# Patient Record
Sex: FEMALE | Race: WHITE | NOT HISPANIC OR LATINO | Employment: OTHER | URBAN - METROPOLITAN AREA
[De-identification: names, ages, dates, MRNs, and addresses within clinical notes are randomized per-mention and may not be internally consistent; named-entity substitution may affect disease eponyms.]

---

## 2017-01-19 ENCOUNTER — HOSPITAL ENCOUNTER (OUTPATIENT)
Dept: CARDIOLOGY | Facility: MEDICAL CENTER | Age: 72
End: 2017-01-19
Attending: INTERNAL MEDICINE
Payer: MEDICARE

## 2017-01-19 DIAGNOSIS — I47.29 NSVT (NONSUSTAINED VENTRICULAR TACHYCARDIA) (HCC): ICD-10-CM

## 2017-01-19 PROCEDURE — 93306 TTE W/DOPPLER COMPLETE: CPT

## 2017-01-20 ENCOUNTER — TELEPHONE (OUTPATIENT)
Dept: CARDIOLOGY | Facility: MEDICAL CENTER | Age: 72
End: 2017-01-20

## 2017-01-20 DIAGNOSIS — R09.89 CAROTID BRUIT, UNSPECIFIED LATERALITY: ICD-10-CM

## 2017-01-20 LAB
LV EJECT FRACT MOD 2C 99903: 52.66
LV EJECT FRACT MOD 4C 99902: 57.21
LV EJECT FRACT MOD BP 99901: 53.8

## 2017-01-21 NOTE — TELEPHONE ENCOUNTER
DONE. Pt Called with message. No answer. Left VM    ----- Message from Leta Davis M.D. sent at 1/20/2017 12:48 PM PST -----    Left ventricular ejection fraction is visually estimated to be 55%.   Mild mitral regurgitation.  Estimated right ventricular systolic pressure  is 29 mmHg. Right atrial pressure is estimated to be 3 mmHg.     ----- Message -----     From: Heath Josef     Sent: 1/20/2017  12:35 PM       To: Leta Davis M.D.

## 2017-01-25 ENCOUNTER — HOSPITAL ENCOUNTER (OUTPATIENT)
Dept: LAB | Facility: MEDICAL CENTER | Age: 72
End: 2017-01-25
Attending: INTERNAL MEDICINE
Payer: MEDICARE

## 2017-01-25 ENCOUNTER — HOSPITAL ENCOUNTER (OUTPATIENT)
Dept: RADIOLOGY | Facility: MEDICAL CENTER | Age: 72
End: 2017-01-25
Attending: INTERNAL MEDICINE
Payer: MEDICARE

## 2017-01-25 DIAGNOSIS — I70.0 ATHEROSCLEROSIS OF AORTA (HCC): ICD-10-CM

## 2017-01-25 DIAGNOSIS — I47.29 NSVT (NONSUSTAINED VENTRICULAR TACHYCARDIA) (HCC): ICD-10-CM

## 2017-01-25 LAB
ALBUMIN SERPL BCP-MCNC: 4.3 G/DL (ref 3.2–4.9)
ALBUMIN/GLOB SERPL: 1.2 G/DL
ALP SERPL-CCNC: 64 U/L (ref 30–99)
ALT SERPL-CCNC: 23 U/L (ref 2–50)
ANION GAP SERPL CALC-SCNC: 10 MMOL/L (ref 0–11.9)
AST SERPL-CCNC: 23 U/L (ref 12–45)
BILIRUB SERPL-MCNC: 1 MG/DL (ref 0.1–1.5)
BUN SERPL-MCNC: 25 MG/DL (ref 8–22)
CALCIUM SERPL-MCNC: 9.5 MG/DL (ref 8.4–10.2)
CHLORIDE SERPL-SCNC: 97 MMOL/L (ref 96–112)
CHOLEST SERPL-MCNC: 191 MG/DL (ref 100–199)
CO2 SERPL-SCNC: 28 MMOL/L (ref 20–33)
CREAT SERPL-MCNC: 0.83 MG/DL (ref 0.5–1.4)
GFR SERPL CREATININE-BSD FRML MDRD: >60 ML/MIN/1.73 M 2
GLOBULIN SER CALC-MCNC: 3.7 G/DL (ref 1.9–3.5)
GLUCOSE SERPL-MCNC: 100 MG/DL (ref 65–99)
HDLC SERPL-MCNC: 48 MG/DL
LDLC SERPL CALC-MCNC: 122 MG/DL
POTASSIUM SERPL-SCNC: 3.9 MMOL/L (ref 3.6–5.5)
PROT SERPL-MCNC: 8 G/DL (ref 6–8.2)
SODIUM SERPL-SCNC: 135 MMOL/L (ref 135–145)
TRIGL SERPL-MCNC: 103 MG/DL (ref 0–149)

## 2017-01-25 PROCEDURE — 36415 COLL VENOUS BLD VENIPUNCTURE: CPT

## 2017-01-25 PROCEDURE — 80061 LIPID PANEL: CPT

## 2017-01-25 PROCEDURE — 700111 HCHG RX REV CODE 636 W/ 250 OVERRIDE (IP)

## 2017-01-25 PROCEDURE — A9502 TC99M TETROFOSMIN: HCPCS

## 2017-01-25 PROCEDURE — 80053 COMPREHEN METABOLIC PANEL: CPT

## 2017-01-25 RX ORDER — REGADENOSON 0.08 MG/ML
INJECTION, SOLUTION INTRAVENOUS
Status: DISPENSED
Start: 2017-01-25 | End: 2017-01-25

## 2017-01-30 ENCOUNTER — HOSPITAL ENCOUNTER (OUTPATIENT)
Dept: RADIOLOGY | Facility: MEDICAL CENTER | Age: 72
End: 2017-01-30
Attending: INTERNAL MEDICINE
Payer: MEDICARE

## 2017-01-30 DIAGNOSIS — R09.89 CAROTID BRUIT, UNSPECIFIED LATERALITY: ICD-10-CM

## 2017-01-30 PROCEDURE — 93880 EXTRACRANIAL BILAT STUDY: CPT | Mod: 26 | Performed by: INTERNAL MEDICINE

## 2017-01-30 PROCEDURE — 93880 EXTRACRANIAL BILAT STUDY: CPT

## 2017-02-08 ENCOUNTER — TELEPHONE (OUTPATIENT)
Dept: CARDIOLOGY | Facility: MEDICAL CENTER | Age: 72
End: 2017-02-08

## 2017-02-08 DIAGNOSIS — E78.5 HYPERLIPIDEMIA, UNSPECIFIED HYPERLIPIDEMIA TYPE: ICD-10-CM

## 2017-02-08 RX ORDER — ROSUVASTATIN CALCIUM 20 MG/1
20 TABLET, COATED ORAL EVERY EVENING
Qty: 90 TAB | Refills: 3 | OUTPATIENT
Start: 2017-02-08 | End: 2017-04-10 | Stop reason: SDUPTHER

## 2017-02-09 NOTE — TELEPHONE ENCOUNTER
----- Message from Leta Davis M.D. sent at 1/30/2017  7:34 AM PST -----  LDL is not at goal  D/c lipitor  Start crestor 20 mg qHs.  Recheck labs in 2 months  Leta  ----- Message -----     From: Sigrid Garcia R.N.     Sent: 1/27/2017   9:27 AM       To: Leta Davis M.D.    F/u 2/7

## 2017-02-09 NOTE — TELEPHONE ENCOUNTER
Pt notified. I called in new Rx to [facility] pharmacy [facility]. She will  lab order when she sees Dr. Davis 2/23. I gave her the results of echo, MPI, & carotid but told her Dr. MARVIN would review in detail at visit.

## 2017-02-15 ENCOUNTER — TELEPHONE (OUTPATIENT)
Dept: CARDIOLOGY | Facility: MEDICAL CENTER | Age: 72
End: 2017-02-15

## 2017-02-15 NOTE — TELEPHONE ENCOUNTER
Mailbox is full and not accepting messages now.  Would like her to call her prescription provider and see what is on her formulary.

## 2017-02-15 NOTE — TELEPHONE ENCOUNTER
----- Message -----     From: Leeann Jasmine     Sent: 2/14/2017  11:33 AM       To: Lata Ko R.N.       Patient can't afford her prescription for Crestor and wants to find out what other medication she can take that's cheaper. She can be reached at [phone] for a call back.

## 2017-02-23 ENCOUNTER — OFFICE VISIT (OUTPATIENT)
Dept: CARDIOLOGY | Facility: MEDICAL CENTER | Age: 72
End: 2017-02-23
Payer: MEDICARE

## 2017-02-23 VITALS
SYSTOLIC BLOOD PRESSURE: 134 MMHG | HEIGHT: 65 IN | OXYGEN SATURATION: 90 % | HEART RATE: 54 BPM | BODY MASS INDEX: 23.49 KG/M2 | DIASTOLIC BLOOD PRESSURE: 76 MMHG | WEIGHT: 141 LBS

## 2017-02-23 DIAGNOSIS — I70.0 ATHEROSCLEROSIS OF AORTA (HCC): ICD-10-CM

## 2017-02-23 DIAGNOSIS — I10 ESSENTIAL HYPERTENSION, BENIGN: ICD-10-CM

## 2017-02-23 DIAGNOSIS — Z13.6 SCREENING FOR ABDOMINAL AORTIC ANEURYSM: ICD-10-CM

## 2017-02-23 DIAGNOSIS — E78.5 DYSLIPIDEMIA: ICD-10-CM

## 2017-02-23 DIAGNOSIS — Z71.6 ENCOUNTER FOR SMOKING CESSATION COUNSELING: ICD-10-CM

## 2017-02-23 PROCEDURE — 3014F SCREEN MAMMO DOC REV: CPT | Mod: 8P | Performed by: INTERNAL MEDICINE

## 2017-02-23 PROCEDURE — 4004F PT TOBACCO SCREEN RCVD TLK: CPT | Performed by: INTERNAL MEDICINE

## 2017-02-23 PROCEDURE — 4040F PNEUMOC VAC/ADMIN/RCVD: CPT | Mod: 8P | Performed by: INTERNAL MEDICINE

## 2017-02-23 PROCEDURE — 99214 OFFICE O/P EST MOD 30 MIN: CPT | Performed by: INTERNAL MEDICINE

## 2017-02-23 PROCEDURE — 3017F COLORECTAL CA SCREEN DOC REV: CPT | Mod: 8P | Performed by: INTERNAL MEDICINE

## 2017-02-23 PROCEDURE — G8420 CALC BMI NORM PARAMETERS: HCPCS | Performed by: INTERNAL MEDICINE

## 2017-02-23 PROCEDURE — 1101F PT FALLS ASSESS-DOCD LE1/YR: CPT | Mod: 8P | Performed by: INTERNAL MEDICINE

## 2017-02-23 PROCEDURE — G8432 DEP SCR NOT DOC, RNG: HCPCS | Performed by: INTERNAL MEDICINE

## 2017-02-23 PROCEDURE — G8484 FLU IMMUNIZE NO ADMIN: HCPCS | Performed by: INTERNAL MEDICINE

## 2017-02-23 RX ORDER — EZETIMIBE 10 MG/1
10 TABLET ORAL DAILY
Qty: 30 TAB | Refills: 11 | Status: SHIPPED | OUTPATIENT
Start: 2017-02-23 | End: 2017-08-24

## 2017-02-23 ASSESSMENT — ENCOUNTER SYMPTOMS
SEIZURES: 0
WEAKNESS: 0
ORTHOPNEA: 0
BLURRED VISION: 0
NAUSEA: 0
CLAUDICATION: 0
HEADACHES: 0
PALPITATIONS: 1
DEPRESSION: 1
LOSS OF CONSCIOUSNESS: 0
VOMITING: 0
BLOOD IN STOOL: 0
ABDOMINAL PAIN: 0
PND: 0
HEMOPTYSIS: 0
NERVOUS/ANXIOUS: 1
DIZZINESS: 0
COUGH: 0
SHORTNESS OF BREATH: 1
BRUISES/BLEEDS EASILY: 0

## 2017-02-23 NOTE — Clinical Note
[facility]   [address]  [place] [address]  Phone: [phone]  Fax: [phone]              Jose Chappell  1945    Encounter Date: 2/23/2017    Leta Davis M.D.          PROGRESS NOTE:  Subjective:   Jose Chappell is a 71 y.o. female with known history of smoking 40 pack years, atherosclerosis of aorta seen on abdominal x-ray, runs of PVCs, dyslipidemia, hypertension presenting today to review the test results with me.    Patient still continues to have intermittent episodes of fluttering sensation in the chest. No complaints of exertional chest pain pressure or tightness. Denied any complaints of orthopnea or PND. No complaints of lower extremity edema or claudication. Denied any complaints of dizziness, lightheadedness or LOC.    Past Medical History   Diagnosis Date   • Hypertension    • Hyperlipidemia    • Rheumatoid arthritis, adult (CMS-HCC)      Past Surgical History   Procedure Laterality Date   • Hysterectomy, vaginal     • Tonsillectomy       Family History   Problem Relation Age of Onset   • Other Mother      Breast cancer   • Lung Disease Father      COPD   • Heart Failure Sister    • Heart Attack Sister      MI at a59     History   Smoking status   • Current Every Day Smoker -- 1.00 packs/day for 40 years   • Types: Cigarettes   Smokeless tobacco   • Never Used     No Known Allergies  Outpatient Encounter Prescriptions as of 2/23/2017   Medication Sig Dispense Refill   • ezetimibe (ZETIA) 10 MG Tab Take 1 Tab by mouth every day. 30 Tab 11   • metoprolol (LOPRESSOR) 50 MG Tab Take 1 Tab by mouth 2 times a day. 60 Tab 11   • montelukast (SINGULAIR) 10 MG Tab Take 10 mg by mouth every day.     • albuterol 108 (90 BASE) MCG/ACT Aero Soln inhalation aerosol Inhale 2 Puffs by mouth every 6 hours as needed for Shortness of Breath.     • alendronate (FOSAMAX) 70 MG Tab Take 70 mg by mouth every 7 days.     •  "escitalopram (LEXAPRO) 10 MG Tab Take 10 mg by mouth every day.     • infliximab (REMICADE) 100 MG Recon Soln by Intravenous route.     • tiotropium (SPIRIVA) 18 MCG Cap Inhale 18 mcg by mouth every day.     • lisinopril (PRINIVIL, ZESTRIL) 40 MG tablet Take 40 mg by mouth every day.     • rosuvastatin (CRESTOR) 20 MG Tab Take 1 Tab by mouth every evening. 90 Tab 3     No facility-administered encounter medications on file as of 2/23/2017.     Review of Systems   Constitutional: Negative for malaise/fatigue.   HENT: Negative for tinnitus.    Eyes: Negative for blurred vision.   Respiratory: Positive for shortness of breath. Negative for cough and hemoptysis.    Cardiovascular: Positive for palpitations. Negative for chest pain, orthopnea, claudication, leg swelling and PND.   Gastrointestinal: Negative for nausea, vomiting, abdominal pain, blood in stool and melena.   Genitourinary: Negative for dysuria and hematuria.   Musculoskeletal: Positive for joint pain.   Skin: Negative for rash.   Neurological: Negative for dizziness, seizures, loss of consciousness, weakness and headaches.   Endo/Heme/Allergies: Does not bruise/bleed easily.   Psychiatric/Behavioral: Positive for depression. The patient is nervous/anxious.         Objective:   /76 mmHg  Pulse 54  Ht 1.651 m (5' 5\")  Wt 63.957 kg (141 lb)  BMI 23.46 kg/m2  SpO2 90%    Physical Exam   Constitutional: She is oriented to person, place, and time. She appears well-developed and well-nourished. No distress.   HENT:   Head: Atraumatic.   Eyes: Conjunctivae and EOM are normal. Pupils are equal, round, and reactive to light.   Neck: Neck supple. No JVD present.   Cardiovascular: Normal rate and regular rhythm.  Frequent extrasystoles are present.   No murmur heard.  Pulses:       Femoral pulses are 2+ on the right side, and 2+ on the left side.       Posterior tibial pulses are 1+ on the right side, and 1+ on the left side.   Pulmonary/Chest: Effort " normal. No respiratory distress. She has no wheezes. She has no rales.   Decreased breath sounds bilaterally   Abdominal: Soft. There is no tenderness.   Musculoskeletal: She exhibits no edema.   Neurological: She is alert and oriented to person, place, and time.   Skin: Skin is warm and dry. She is not diaphoretic.   Psychiatric: She has a normal mood and affect.      Results for MICHELE DELA CRUZ (MRN [id number]) as of 1/30/2017 07:33   1/25/2017    Sodium 135   Potassium 3.9   Chloride 97   Co2 28   Anion Gap 10.0   Glucose 100 (H)   Bun 25 (H)   Creatinine 0.83   GFR If Non African American >60   Calcium 9.5   AST(SGOT) 23   ALT(SGPT) 23   Alkaline Phosphatase 64   Cholesterol,Tot 191   Triglycerides 103   HDL 48    (H)     Carotid doppler: 1/30/17  No previous duplex.  Very mild plaque of the bilateral carotid bifurcation. Doppler velocities are normal.   Bilateral subclavian and vertebral artery waveforms are antegrade and waveforms are normal in character and velocity    TTE:1/19/17  No prior study is available for comparison.   Left ventricular ejection fraction is visually estimated to be 55%.   Normal regional wall motion. Grade I diastolic dysfunction.  Mild mitral regurgitation.  Estimated right ventricular systolic pressure  is 29 mmHg. Right atrial pressure is estimated to be 3 mmHg.     Nuclear stress test: 1/25/17  Exercise capacity fair to good at 6-10 METS.   No ischemic changes with exercise.   No evidence of significant jeopardized viable myocardium or prior myocardial infarction.  Normal left ventricular size, ejection fraction, and wall motion.  ECG INTERPRETATION  Exercise capacity fair to good at 6-10 METS    Holter monitor: 10/5/16  Underlying rhythm sinus with a minimum heart rate of 48 bpm, average heart rate of 79 bpm with a maximum heart rate of 130 bpm  Total ventricular ectopic beats 12.6%.  3 beat run of PVCs  Longest run of PSVT is 16 beats.  No symptoms documented.  16 beat  run of PSVT     Labs: 16  Glucose 100, BUN 16, creatinine 0.75, bicarbonate 28.7, sodium 142, potassium 4.8, chloride 107  Alkaline phosphatase 67, ALT 38 AST 18  Triglycerides 59, total cholesterol 155, HDL 56, LDL 87    EK16  EKG personally reviewed by me.  Sinus rhythm normal axis LVH with repolarization abnormalities, PVC  Assessment:     1. Screening for abdominal aortic aneurysm  US-AORTA   2. Essential hypertension, benign     3. Atherosclerosis of aorta (CMS-HCC)  US-AORTA   4. Dyslipidemia  ezetimibe (ZETIA) 10 MG Tab    COMP METABOLIC PANEL    LIPID PROFILE    COMP METABOLIC PANEL    LIPID PROFILE   5. Encounter for smoking cessation counseling  REFERRAL TO TOBACCO CESSATION PROGRAM       Medical Decision Making:  Today's Assessment / Status / Plan:     1. Abdominal aortic Doppler prior to next visit.   2. Blood pressure well controlled at the present visit.  Continue lisinopril 40 mg daily.  Continue Lopressor 50 mg BID.  3. Patient currently on Lipitor.  4. Continue Lipitor 40 mg qHs.  Start Zetia 10 mg daily  Check labs in 2 months  5. Referral for smoking cessation program.    Follow-up in one year.  Check labs in 2 months and then in one year..    Thank you for allowing me to participate in taking care of patient.    Leta Davis. MD.        Viv Ramsey, A.P.R.N.  [address] [address]  VIA In Basket

## 2017-02-23 NOTE — PROGRESS NOTES
Subjective:   Jose Chappell is a 71 y.o. female with known history of smoking 40 pack years, atherosclerosis of aorta seen on abdominal x-ray, runs of PVCs, dyslipidemia, hypertension presenting today to review the test results with me.    Patient still continues to have intermittent episodes of fluttering sensation in the chest. No complaints of exertional chest pain pressure or tightness. Denied any complaints of orthopnea or PND. No complaints of lower extremity edema or claudication. Denied any complaints of dizziness, lightheadedness or LOC.    Past Medical History   Diagnosis Date   • Hypertension    • Hyperlipidemia    • Rheumatoid arthritis, adult (CMS-HCC)      Past Surgical History   Procedure Laterality Date   • Hysterectomy, vaginal     • Tonsillectomy       Family History   Problem Relation Age of Onset   • Other Mother      Breast cancer   • Lung Disease Father      COPD   • Heart Failure Sister    • Heart Attack Sister      MI at a59     History   Smoking status   • Current Every Day Smoker -- 1.00 packs/day for 40 years   • Types: Cigarettes   Smokeless tobacco   • Never Used     No Known Allergies  Outpatient Encounter Prescriptions as of 2/23/2017   Medication Sig Dispense Refill   • ezetimibe (ZETIA) 10 MG Tab Take 1 Tab by mouth every day. 30 Tab 11   • metoprolol (LOPRESSOR) 50 MG Tab Take 1 Tab by mouth 2 times a day. 60 Tab 11   • montelukast (SINGULAIR) 10 MG Tab Take 10 mg by mouth every day.     • albuterol 108 (90 BASE) MCG/ACT Aero Soln inhalation aerosol Inhale 2 Puffs by mouth every 6 hours as needed for Shortness of Breath.     • alendronate (FOSAMAX) 70 MG Tab Take 70 mg by mouth every 7 days.     • escitalopram (LEXAPRO) 10 MG Tab Take 10 mg by mouth every day.     • infliximab (REMICADE) 100 MG Recon Soln by Intravenous route.     • tiotropium (SPIRIVA) 18 MCG Cap Inhale 18 mcg by mouth every day.     • lisinopril (PRINIVIL, ZESTRIL) 40 MG tablet Take 40 mg by mouth every day.    "  • rosuvastatin (CRESTOR) 20 MG Tab Take 1 Tab by mouth every evening. 90 Tab 3     No facility-administered encounter medications on file as of 2/23/2017.     Review of Systems   Constitutional: Negative for malaise/fatigue.   HENT: Negative for tinnitus.    Eyes: Negative for blurred vision.   Respiratory: Positive for shortness of breath. Negative for cough and hemoptysis.    Cardiovascular: Positive for palpitations. Negative for chest pain, orthopnea, claudication, leg swelling and PND.   Gastrointestinal: Negative for nausea, vomiting, abdominal pain, blood in stool and melena.   Genitourinary: Negative for dysuria and hematuria.   Musculoskeletal: Positive for joint pain.   Skin: Negative for rash.   Neurological: Negative for dizziness, seizures, loss of consciousness, weakness and headaches.   Endo/Heme/Allergies: Does not bruise/bleed easily.   Psychiatric/Behavioral: Positive for depression. The patient is nervous/anxious.         Objective:   /76 mmHg  Pulse 54  Ht 1.651 m (5' 5\")  Wt 63.957 kg (141 lb)  BMI 23.46 kg/m2  SpO2 90%    Physical Exam   Constitutional: She is oriented to person, place, and time. She appears well-developed and well-nourished. No distress.   HENT:   Head: Atraumatic.   Eyes: Conjunctivae and EOM are normal. Pupils are equal, round, and reactive to light.   Neck: Neck supple. No JVD present.   Cardiovascular: Normal rate and regular rhythm.  Frequent extrasystoles are present.   No murmur heard.  Pulses:       Femoral pulses are 2+ on the right side, and 2+ on the left side.       Posterior tibial pulses are 1+ on the right side, and 1+ on the left side.   Pulmonary/Chest: Effort normal. No respiratory distress. She has no wheezes. She has no rales.   Decreased breath sounds bilaterally   Abdominal: Soft. There is no tenderness.   Musculoskeletal: She exhibits no edema.   Neurological: She is alert and oriented to person, place, and time.   Skin: Skin is warm and " dry. She is not diaphoretic.   Psychiatric: She has a normal mood and affect.      Results for MICHELE DELA CRUZ (MRN [id number]) as of 2017 07:33   2017    Sodium 135   Potassium 3.9   Chloride 97   Co2 28   Anion Gap 10.0   Glucose 100 (H)   Bun 25 (H)   Creatinine 0.83   GFR If Non African American >60   Calcium 9.5   AST(SGOT) 23   ALT(SGPT) 23   Alkaline Phosphatase 64   Cholesterol,Tot 191   Triglycerides 103   HDL 48    (H)     Carotid doppler: 17  No previous duplex.  Very mild plaque of the bilateral carotid bifurcation. Doppler velocities are normal.   Bilateral subclavian and vertebral artery waveforms are antegrade and waveforms are normal in character and velocity    TTE:17  No prior study is available for comparison.   Left ventricular ejection fraction is visually estimated to be 55%.   Normal regional wall motion. Grade I diastolic dysfunction.  Mild mitral regurgitation.  Estimated right ventricular systolic pressure  is 29 mmHg. Right atrial pressure is estimated to be 3 mmHg.     Nuclear stress test: 17  Exercise capacity fair to good at 6-10 METS.   No ischemic changes with exercise.   No evidence of significant jeopardized viable myocardium or prior myocardial infarction.  Normal left ventricular size, ejection fraction, and wall motion.  ECG INTERPRETATION  Exercise capacity fair to good at 6-10 METS    Holter monitor: 10/5/16  Underlying rhythm sinus with a minimum heart rate of 48 bpm, average heart rate of 79 bpm with a maximum heart rate of 130 bpm  Total ventricular ectopic beats 12.6%.  3 beat run of PVCs  Longest run of PSVT is 16 beats.  No symptoms documented.  16 beat run of PSVT     Labs: 16  Glucose 100, BUN 16, creatinine 0.75, bicarbonate 28.7, sodium 142, potassium 4.8, chloride 107  Alkaline phosphatase 67, ALT 38 AST 18  Triglycerides 59, total cholesterol 155, HDL 56, LDL 87    EK16  EKG personally reviewed by me.  Sinus rhythm  normal axis LVH with repolarization abnormalities, PVC  Assessment:     1. Screening for abdominal aortic aneurysm  US-AORTA   2. Essential hypertension, benign     3. Atherosclerosis of aorta (CMS-HCC)  US-AORTA   4. Dyslipidemia  ezetimibe (ZETIA) 10 MG Tab    COMP METABOLIC PANEL    LIPID PROFILE    COMP METABOLIC PANEL    LIPID PROFILE   5. Encounter for smoking cessation counseling  REFERRAL TO TOBACCO CESSATION PROGRAM       Medical Decision Making:  Today's Assessment / Status / Plan:     1. Abdominal aortic Doppler prior to next visit.   2. Blood pressure well controlled at the present visit.  Continue lisinopril 40 mg daily.  Continue Lopressor 50 mg BID.  3. Patient currently on Lipitor.  4. Continue Lipitor 40 mg qHs.  Start Zetia 10 mg daily  Check labs in 2 months  5. Referral for smoking cessation program.    Follow-up in one year.  Check labs in 2 months and then in one year..    Thank you for allowing me to participate in taking care of patient.    Leta Castro MD.

## 2017-02-23 NOTE — MR AVS SNAPSHOT
"Jose Chappell   2017 10:45 AM   Office Visit   MRN: [id number]    Department:  [facility]   Dept Phone:  [phone]    Description:  Female : 1945   Provider:  Leta Davis M.D.           Allergies as of 2017     No Known Allergies      You were diagnosed with     Screening for abdominal aortic aneurysm   [255441]       Essential hypertension, benign   [401.1.ICD-9-CM]       Atherosclerosis of aorta (CMS-HCC)   [440.0.ICD-9-CM]       Dyslipidemia   [175052]       Encounter for smoking cessation counseling   [954109]         Vital Signs     Blood Pressure Pulse Height Weight Body Mass Index Oxygen Saturation    134/76 mmHg 54 1.651 m (5' 5\") 63.957 kg (141 lb) 23.46 kg/m2 90%    Smoking Status                   Current Every Day Smoker           Basic Information     Date Of Birth Sex Race Ethnicity Preferred Language    1945 Female White Non- English      Your appointments     2017 10:45 AM   FOLLOW UP with Leta Davis M.D.   [facility]    [address]  [facility] [address] [phone]              Problem List              ICD-10-CM Priority Class Noted - Resolved    Essential hypertension, benign I10   2016 - Present    Encounter for smoking cessation counseling Z71.6, Z72.0   2016 - Present    NSVT (nonsustained ventricular tachycardia) (CMS-HCC) I47.2   2016 - Present    PSVT (paroxysmal supraventricular tachycardia) (CMS-HCC) I47.1   2016 - Present    Atherosclerosis of aorta (CMS-HCC) I70.0   2016 - Present      Health Maintenance        Date Due Completion Dates    IMM DTaP/Tdap/Td Vaccine (1 - Tdap) 1965 ---    PAP SMEAR 1967 ---    MAMMOGRAM 1986 ---    COLONOSCOPY 1996 ---    IMM ZOSTER VACCINE 2006 ---    BONE DENSITY 2011 ---    IMM PNEUMOCOCCAL 65+ (ADULT) LOW/MEDIUM RISK SERIES (1 of 2 - PCV13) 2011 ---    IMM " INFLUENZA (1) 9/1/2016 ---            Current Immunizations     No immunizations on file.      Below and/or attached are the medications your provider expects you to take. Review all of your home medications and newly ordered medications with your provider and/or pharmacist. Follow medication instructions as directed by your provider and/or pharmacist. Please keep your medication list with you and share with your provider. Update the information when medications are discontinued, doses are changed, or new medications (including over-the-counter products) are added; and carry medication information at all times in the event of emergency situations     Allergies:  No Known Allergies          Medications  Valid as of: February 23, 2017 - 10:29 AM    Generic Name Brand Name Tablet Size Instructions for use    Albuterol Sulfate (Aero Soln) albuterol 108 (90 BASE) MCG/ACT Inhale 2 Puffs by mouth every 6 hours as needed for Shortness of Breath.        Alendronate Sodium (Tab) FOSAMAX 70 MG Take 70 mg by mouth every 7 days.        Escitalopram Oxalate (Tab) LEXAPRO 10 MG Take 10 mg by mouth every day.        Ezetimibe (Tab) ZETIA 10 MG Take 1 Tab by mouth every day.        InFLIXimab (Recon Soln) REMICADE 100 MG by Intravenous route.        Lisinopril (Tab) PRINIVIL, ZESTRIL 40 MG Take 40 mg by mouth every day.        Metoprolol Tartrate (Tab) LOPRESSOR 50 MG Take 1 Tab by mouth 2 times a day.        Montelukast Sodium (Tab) SINGULAIR 10 MG Take 10 mg by mouth every day.        Rosuvastatin Calcium (Tab) CRESTOR 20 MG Take 1 Tab by mouth every evening.        Tiotropium Bromide Monohydrate (Cap) SPIRIVA 18 MCG Inhale 18 mcg by mouth every day.        .                 Medicines prescribed today were sent to:     [facility] [address] [facility] [address] [facility] [address] [address]    [address] [facility] [address]    Phone: [phone] Fax: [phone]    Open   Hours?: No      Medication refill instructions:       If your prescription bottle indicates you have medication refills left, it is not necessary to call your provider’s office. Please contact your pharmacy and they will refill your medication.    If your prescription bottle indicates you do not have any refills left, you may request refills at any time through one of the following ways: The online Lookmash system (except Urgent Care), by calling your provider’s office, or by asking your pharmacy to contact your provider’s office with a refill request. Medication refills are processed only during regular business hours and may not be available until the next business day. Your provider may request additional information or to have a follow-up visit with you prior to refilling your medication.   *Please Note: Medication refills are assigned a new Rx number when refilled electronically. Your pharmacy may indicate that no refills were authorized even though a new prescription for the same medication is available at the pharmacy. Please request the medicine by name with the pharmacy before contacting your provider for a refill.        Your To Do List     Future Labs/Procedures Complete By Expires    COMP METABOLIC PANEL  As directed 2/23/2018    COMP METABOLIC PANEL  As directed 2/23/2018    LIPID PROFILE  As directed 2/23/2018    LIPID PROFILE  As directed 2/23/2018    US-AORTA  As directed 2/23/2018      Referral     A referral request has been sent to our patient care coordination department. Please allow 3-5 business days for us to process this request and contact you either by phone or mail. If you do not hear from us by the 5th business day, please call us at [phone].           Lookmash Access Code: [email or web address]  Expires: 3/25/2017 10:29 AM    Lookmash  A secure, online tool to manage your health information     [employer]’s Lookmash® is a secure, online tool that connects you to your personalized  health information from the privacy of your home -- day or night - making it very easy for you to manage your healthcare. Once the activation process is completed, you can even access your medical information using the Sepior rachel, which is available for free in the Apple Rachel store or Google Play store.     Sepior provides the following levels of access (as shown below):   My Chart Features   [facility] Doctor [facility]  Specialists [facility]  Urgent  Care Non-[facility]  Primary Care  Doctor   Email your healthcare team securely and privately 24/7 X X X    Manage appointments: schedule your next appointment; view details of past/upcoming appointments X      Request prescription refills. X      View recent personal medical records, including lab and immunizations X X X X   View health record, including health history, allergies, medications X X X X   Read reports about your outpatient visits, procedures, consult and ER notes X X X X   See your discharge summary, which is a recap of your hospital and/or ER visit that includes your diagnosis, lab results, and care plan. X X       How to register for Sepior:  1. Go to  [email or web address].  2. Click on the Sign Up Now box, which takes you to the New Member Sign Up page. You will need to provide the following information:  a. Enter your Sepior Access Code exactly as it appears at the top of this page. (You will not need to use this code after you’ve completed the sign-up process. If you do not sign up before the expiration date, you must request a new code.)   b. Enter your date of birth.   c. Enter your home email address.   d. Click Submit, and follow the next screen’s instructions.  3. Create a Sepior ID. This will be your Sepior login ID and cannot be changed, so think of one that is secure and easy to remember.  4. Create a Sepior password. You can change your password at any time.  5. Enter your Password Reset Question and Answer. This can be used at a  later time if you forget your password.   6. Enter your e-mail address. This allows you to receive e-mail notifications when new information is available in quitchen.  7. Click Sign Up. You can now view your health information.    For assistance activating your quitchen account, call [phone]

## 2017-03-21 NOTE — PROGRESS NOTES
Abdominal aortic doppler: 3/20/17  There is evidence of atherosclerotic disease of the abdominal aorta, but there is no evidence of aneurysmal dilatation.

## 2017-03-23 ENCOUNTER — TELEPHONE (OUTPATIENT)
Dept: CARDIOLOGY | Facility: MEDICAL CENTER | Age: 72
End: 2017-03-23

## 2017-03-23 DIAGNOSIS — E78.5 HYPERLIPIDEMIA, UNSPECIFIED HYPERLIPIDEMIA TYPE: ICD-10-CM

## 2017-03-23 NOTE — TELEPHONE ENCOUNTER
L/M for patient to return my call to go over US aorta and plan for labs - will await return phone call

## 2017-03-23 NOTE — TELEPHONE ENCOUNTER
----- Message from Leta Davis M.D. sent at 3/21/2017  2:46 PM PDT -----  Please inform patient there is evidence of atherosclerotic disease of the abdominal aorta, but there is no evidence of aneurysmal dilatation.  Since pt has atherosclerotic disease of the abdominal aorta will Target LDL less than 70.  According my note i said check labs in 2 months- based on labs will decide what to do next    Leta    ----- Message -----     From: Adolfo Barrientos     Sent: 3/20/2017  10:50 AM       To: Leta Davis M.D.    Saw in clinic on 2/23 - labs to be done next month and f/u with you in a year

## 2017-04-10 RX ORDER — ROSUVASTATIN CALCIUM 20 MG/1
20 TABLET, COATED ORAL EVERY EVENING
Qty: 90 TAB | Refills: 3 | Status: SHIPPED
Start: 2017-04-10 | End: 2019-05-21

## 2017-04-10 NOTE — TELEPHONE ENCOUNTER
YON/zhang     Pt calling about getting meds (just Crestor) to come from [facility]. Pt has set up an account.  Has [facility] ([email or web address]) contacted you by fax yet?       Also, pt said she has not yet received the results of ultrasound of aorta done at [facility] in [place] about 1 month ago.     Please call pt [phone].

## 2017-04-10 NOTE — TELEPHONE ENCOUNTER
Spoke with patient who states she can not afford crestor or zetia.  She found a [facility] pharmacy who will supply but she must provide them with the prescription.  I told her I would print new rx for her and mail it to her and she could submit it and see if it was possible for her to get discounted medication.  If she can NOT get this medication filled as it is too expensive even with them then she will call back to set up appt with Yvonne to discuss a different medication.  Also went over aortic ultrasound results with patient and gave informed her that our goal is keep LDL less than 70.  She will recheck Lipid panel once she starts crestor - specifically 2 months after she has been on med.  She verbalized understanding and will keep us posted on the medication and if she starts it or not.

## 2017-08-24 ENCOUNTER — OFFICE VISIT (OUTPATIENT)
Dept: CARDIOLOGY | Facility: CLINIC | Age: 72
End: 2017-08-24
Payer: MEDICARE

## 2017-08-24 VITALS
HEART RATE: 62 BPM | DIASTOLIC BLOOD PRESSURE: 80 MMHG | BODY MASS INDEX: 21.99 KG/M2 | WEIGHT: 132 LBS | SYSTOLIC BLOOD PRESSURE: 130 MMHG | OXYGEN SATURATION: 95 % | HEIGHT: 65 IN

## 2017-08-24 DIAGNOSIS — E78.5 DYSLIPIDEMIA: ICD-10-CM

## 2017-08-24 DIAGNOSIS — I10 ESSENTIAL HYPERTENSION, BENIGN: ICD-10-CM

## 2017-08-24 DIAGNOSIS — I70.0 ATHEROSCLEROSIS OF AORTA (HCC): ICD-10-CM

## 2017-08-24 DIAGNOSIS — I47.10 PSVT (PAROXYSMAL SUPRAVENTRICULAR TACHYCARDIA): ICD-10-CM

## 2017-08-24 PROCEDURE — 99214 OFFICE O/P EST MOD 30 MIN: CPT | Performed by: INTERNAL MEDICINE

## 2017-08-24 ASSESSMENT — ENCOUNTER SYMPTOMS
SHORTNESS OF BREATH: 0
ABDOMINAL PAIN: 0
NERVOUS/ANXIOUS: 1
CLAUDICATION: 0
BLOOD IN STOOL: 0
PALPITATIONS: 0
WEAKNESS: 0
BRUISES/BLEEDS EASILY: 0
NAUSEA: 0
PND: 0
ORTHOPNEA: 0
DIZZINESS: 0
DEPRESSION: 1
HEMOPTYSIS: 0
COUGH: 0
HEADACHES: 0
LOSS OF CONSCIOUSNESS: 0
BLURRED VISION: 0
SEIZURES: 0
VOMITING: 0

## 2017-08-24 NOTE — MR AVS SNAPSHOT
"        Mamonique Block Ta   2017 1:00 PM   Office Visit   MRN: [id number]    Department:  [facility]   Dept Phone:  [phone]    Description:  Female : 1945   Provider:  Leta Davis M.D.           Reason for Visit     Follow-Up           Allergies as of 2017     No Known Allergies      Vital Signs     Blood Pressure Pulse Height Weight Body Mass Index Oxygen Saturation    130/80 mmHg 62 1.651 m (5' 5\") 59.875 kg (132 lb) 21.97 kg/m2 95%    Smoking Status                   Current Every Day Smoker           Basic Information     Date Of Birth Sex Race Ethnicity Preferred Language    1945 Female White Non- English      Problem List              ICD-10-CM Priority Class Noted - Resolved    Essential hypertension, benign I10   2016 - Present    Encounter for smoking cessation counseling Z71.6, Z72.0   2016 - Present    NSVT (nonsustained ventricular tachycardia) (CMS-HCC) I47.2   2016 - Present    PSVT (paroxysmal supraventricular tachycardia) (CMS-HCC) I47.1   2016 - Present    Atherosclerosis of aorta (CMS-HCC) I70.0   2016 - Present      Health Maintenance        Date Due Completion Dates    IMM DTaP/Tdap/Td Vaccine (1 - Tdap) 1965 ---    PAP SMEAR 1967 ---    MAMMOGRAM 1986 ---    COLONOSCOPY 1996 ---    IMM ZOSTER VACCINE 2006 ---    BONE DENSITY 2011 ---    IMM PNEUMOCOCCAL 65+ (ADULT) LOW/MEDIUM RISK SERIES (1 of 2 - PCV13) 2011 ---    IMM INFLUENZA (1) 2017 ---            Current Immunizations     No immunizations on file.      Below and/or attached are the medications your provider expects you to take. Review all of your home medications and newly ordered medications with your provider and/or pharmacist. Follow medication instructions as directed by your provider and/or pharmacist. Please keep your medication list with you and share with your provider. Update the information when medications are discontinued, " doses are changed, or new medications (including over-the-counter products) are added; and carry medication information at all times in the event of emergency situations     Allergies:  No Known Allergies          Medications  Valid as of: August 24, 2017 -  1:19 PM    Generic Name Brand Name Tablet Size Instructions for use    Albuterol Sulfate (Aero Soln) albuterol 108 (90 Base) MCG/ACT Inhale 2 Puffs by mouth every 6 hours as needed for Shortness of Breath.        Alendronate Sodium (Tab) FOSAMAX 70 MG Take 70 mg by mouth every 7 days.        Escitalopram Oxalate (Tab) LEXAPRO 10 MG Take 10 mg by mouth every day.        InFLIXimab (Recon Soln) REMICADE 100 MG by Intravenous route.        Montelukast Sodium (Tab) SINGULAIR 10 MG Take 10 mg by mouth every day.        Rosuvastatin Calcium (Tab) CRESTOR 20 MG Take 1 Tab by mouth every evening.        Tiotropium Bromide Monohydrate (Cap) SPIRIVA 18 MCG Inhale 18 mcg by mouth every day.        .                 Medicines prescribed today were sent to:     [facility] [address] [place] [address] N AT [facility] ([facility] [address]) & [address] [facility] [address]    Phone: [phone] Fax: [phone]    Open 24 Hours?: No      Medication refill instructions:       If your prescription bottle indicates you have medication refills left, it is not necessary to call your provider’s office. Please contact your pharmacy and they will refill your medication.    If your prescription bottle indicates you do not have any refills left, you may request refills at any time through one of the following ways: The online [employer] system (except Urgent Care), by calling your provider’s office, or by asking your pharmacy to contact your provider’s office with a refill request. Medication refills are processed only during regular business hours and may not be available until the next business day. Your provider may request additional  information or to have a follow-up visit with you prior to refilling your medication.   *Please Note: Medication refills are assigned a new Rx number when refilled electronically. Your pharmacy may indicate that no refills were authorized even though a new prescription for the same medication is available at the pharmacy. Please request the medicine by name with the pharmacy before contacting your provider for a refill.           Asurint Access Code: [email or web address]  Expires: 9/23/2017  1:19 PM    Asurint  A secure, online tool to manage your health information     [employer]’s Asurint® is a secure, online tool that connects you to your personalized health information from the privacy of your home -- day or night - making it very easy for you to manage your healthcare. Once the activation process is completed, you can even access your medical information using the Asurint rachel, which is available for free in the Apple Rachel store or Google Play store.     Asurint provides the following levels of access (as shown below):   My Chart Features   [facility] Primary Care Doctor [facility]  Specialists [facility]  Urgent  Care Non-[facility]  Primary Care  Doctor   Email your healthcare team securely and privately 24/7 X X X    Manage appointments: schedule your next appointment; view details of past/upcoming appointments X      Request prescription refills. X      View recent personal medical records, including lab and immunizations X X X X   View health record, including health history, allergies, medications X X X X   Read reports about your outpatient visits, procedures, consult and ER notes X X X X   See your discharge summary, which is a recap of your hospital and/or ER visit that includes your diagnosis, lab results, and care plan. X X       How to register for Asurint:  1. Go to  [email or web address].  2. Click on the Sign Up Now box, which takes you to the New Member Sign Up page. You will need to provide the following  information:  a. Enter your [employer] Access Code exactly as it appears at the top of this page. (You will not need to use this code after you’ve completed the sign-up process. If you do not sign up before the expiration date, you must request a new code.)   b. Enter your date of birth.   c. Enter your home email address.   d. Click Submit, and follow the next screen’s instructions.  3. Create a Point.io ID. This will be your Point.io login ID and cannot be changed, so think of one that is secure and easy to remember.  4. Create a [employer] password. You can change your password at any time.  5. Enter your Password Reset Question and Answer. This can be used at a later time if you forget your password.   6. Enter your e-mail address. This allows you to receive e-mail notifications when new information is available in Point.io.  7. Click Sign Up. You can now view your health information.    For assistance activating your Point.io account, call [phone]        Quit Tobacco Information     Do you want to quit using tobacco?    Quitting tobacco decreases risks of cancer, heart and lung disease, increases life expectancy, improves sense of taste and smell, and increases spending money, among other benefits.    If you are thinking about quitting, we can help.  • [facility] Quit Tobacco Program: 950.362.8038  o Program occurs weekly for four weeks and includes pharmacist consultation on products to support quitting smoking or chewing tobacco. A provider referral is needed for pharmacist consultation.  • Tobacco Users Help Hotline: 3-800-QUIT-NOW (124-7725) or https://[place].quitlogix.org/  o Free, confidential telephone and online coaching for [place] residents. Sessions are designed on a schedule that is convenient for you. Eligible clients receive free nicotine replacement therapy.  • Nationally: www.smokefree.gov  o Information and professional assistance to support both immediate and long-term needs as you become, and remain,  a non-smoker. Smokefree.gov allows you to choose the help that best fits your needs.

## 2017-08-24 NOTE — PROGRESS NOTES
Subjective:   Jose Chappell is a 72 y.o. female with known history of smoking 40 pack years, atherosclerosis of aorta seen on abdominal x-ray, runs of PVCs, dyslipidemia, hypertension Presenting today for follow-up evaluation of dyslipidemia.    Patient denied any complaints of exertional chest pain pressure or tightness. No complaints of palpitations orthopnea or PND. Denied any complaints of dizziness lightheadedness or LOC. Unclear what the patient is taking lisinopril or not but as of today blood pressure is well controlled. Patient has successfully quit smoking and also mentioned to me that she has lost weight in the process.    Past Medical History   Diagnosis Date   • Hypertension    • Hyperlipidemia    • Rheumatoid arthritis, adult (CMS-HCC)      Past Surgical History   Procedure Laterality Date   • Hysterectomy, vaginal     • Tonsillectomy       Family History   Problem Relation Age of Onset   • Other Mother      Breast cancer   • Lung Disease Father      COPD   • Heart Failure Sister    • Heart Attack Sister      MI at a59     History   Smoking status   • Current Every Day Smoker -- 1.00 packs/day for 40 years   • Types: Cigarettes   Smokeless tobacco   • Never Used     No Known Allergies  Outpatient Encounter Prescriptions as of 8/24/2017   Medication Sig Dispense Refill   • rosuvastatin (CRESTOR) 20 MG Tab Take 1 Tab by mouth every evening. 90 Tab 3   • escitalopram (LEXAPRO) 10 MG Tab Take 10 mg by mouth every day.     • [DISCONTINUED] ezetimibe (ZETIA) 10 MG Tab Take 1 Tab by mouth every day. 30 Tab 11   • [DISCONTINUED] metoprolol (LOPRESSOR) 50 MG Tab Take 1 Tab by mouth 2 times a day. 60 Tab 11   • montelukast (SINGULAIR) 10 MG Tab Take 10 mg by mouth every day.     • albuterol 108 (90 BASE) MCG/ACT Aero Soln inhalation aerosol Inhale 2 Puffs by mouth every 6 hours as needed for Shortness of Breath.     • alendronate (FOSAMAX) 70 MG Tab Take 70 mg by mouth every 7 days.     • infliximab (REMICADE)  "100 MG Recon Soln by Intravenous route.     • tiotropium (SPIRIVA) 18 MCG Cap Inhale 18 mcg by mouth every day.     • [DISCONTINUED] lisinopril (PRINIVIL, ZESTRIL) 40 MG tablet Take 40 mg by mouth every day.       No facility-administered encounter medications on file as of 8/24/2017.     Review of Systems   Constitutional: Negative for malaise/fatigue.   HENT: Negative for tinnitus.    Eyes: Negative for blurred vision.   Respiratory: Negative for cough, hemoptysis and shortness of breath.    Cardiovascular: Negative for chest pain, palpitations, orthopnea, claudication, leg swelling and PND.   Gastrointestinal: Negative for nausea, vomiting, abdominal pain, blood in stool and melena.   Genitourinary: Negative for dysuria and hematuria.   Musculoskeletal: Positive for joint pain.   Skin: Negative for rash.   Neurological: Negative for dizziness, seizures, loss of consciousness, weakness and headaches.   Endo/Heme/Allergies: Does not bruise/bleed easily.   Psychiatric/Behavioral: Positive for depression. The patient is nervous/anxious.         Objective:   /80 mmHg  Pulse 62  Ht 1.651 m (5' 5\")  Wt 59.875 kg (132 lb)  BMI 21.97 kg/m2  SpO2 95%    Physical Exam   Constitutional: She is oriented to person, place, and time. She appears well-developed and well-nourished. No distress.   HENT:   Head: Atraumatic.   Eyes: Conjunctivae and EOM are normal. Pupils are equal, round, and reactive to light.   Neck: Neck supple. No JVD present.   Cardiovascular: Normal rate and regular rhythm.  Frequent extrasystoles are present.   No murmur heard.  Pulses:       Femoral pulses are 2+ on the right side, and 2+ on the left side.       Posterior tibial pulses are 1+ on the right side, and 1+ on the left side.   Pulmonary/Chest: Effort normal. No respiratory distress. She has no wheezes. She has no rales.   Decreased breath sounds bilaterally   Abdominal: Soft. There is no tenderness.   Musculoskeletal: She exhibits no " edema.   Neurological: She is alert and oriented to person, place, and time.   Skin: Skin is warm and dry. She is not diaphoretic.   Psychiatric: She has a normal mood and affect.      Results for MICHELE DELA CRUZ (MRN [id number]) as of 1/30/2017 07:33   1/25/2017    Sodium 135   Potassium 3.9   Chloride 97   Co2 28   Anion Gap 10.0   Glucose 100 (H)   Bun 25 (H)   Creatinine 0.83   GFR If Non African American >60   Calcium 9.5   AST(SGOT) 23   ALT(SGPT) 23   Alkaline Phosphatase 64   Cholesterol,Tot 191   Triglycerides 103   HDL 48    (H)     Carotid doppler: 1/30/17  No previous duplex.  Very mild plaque of the bilateral carotid bifurcation. Doppler velocities are normal.   Bilateral subclavian and vertebral artery waveforms are antegrade and waveforms are normal in character and velocity    TTE:1/19/17  No prior study is available for comparison.   Left ventricular ejection fraction is visually estimated to be 55%.   Normal regional wall motion. Grade I diastolic dysfunction.  Mild mitral regurgitation.  Estimated right ventricular systolic pressure  is 29 mmHg. Right atrial pressure is estimated to be 3 mmHg.     Nuclear stress test: 1/25/17  Exercise capacity fair to good at 6-10 METS.   No ischemic changes with exercise.   No evidence of significant jeopardized viable myocardium or prior myocardial infarction.  Normal left ventricular size, ejection fraction, and wall motion.  ECG INTERPRETATION  Exercise capacity fair to good at 6-10 METS    Holter monitor: 10/5/16  Underlying rhythm sinus with a minimum heart rate of 48 bpm, average heart rate of 79 bpm with a maximum heart rate of 130 bpm  Total ventricular ectopic beats 12.6%.  3 beat run of PVCs  Longest run of PSVT is 16 beats.  No symptoms documented.  16 beat run of PSVT     Labs: 4/21/16  Glucose 100, BUN 16, creatinine 0.75, bicarbonate 28.7, sodium 142, potassium 4.8, chloride 107  Alkaline phosphatase 67, ALT 38 AST 18  Triglycerides 59,  total cholesterol 155, HDL 56, LDL 87    EK16  EKG personally reviewed by me.  Sinus rhythm normal axis LVH with repolarization abnormalities, PVC  Assessment:     1. Aortic atherosclerosis  2. Hypertension  3. Dyslipidemia  4. PSVT  Medical Decision Making:  Today's Assessment / Status / Plan:     1.Patient was successful in quitting smoking.  Continue statins.  2. Unclear whether patient is on lisinopril or not. She is unable to tolerate metoprolol due to metallic taste.   Advised patient to contact our office and give us updated list of medications.   3. Continue Crestor 20 mg qHs.  Patient caught her blood work done in [place] will try to get those results.  4. Nuclear stress test negative for ischemia  Echocardiogram showed normal LV function.  Patient unable to tolerate beta blockers.  Will continue to monitor for now.    Follow-up in one year.  Labs prior to next visit.    Thank you for allowing me to participate in taking care of patient.    Leta Castro MD.

## 2017-08-24 NOTE — Clinical Note
[facility] [address],   [address]nd Floor  [place] [address]  Phone: [phone]  Fax: [phone]              Jose Chappell  1945    Encounter Date: 8/24/2017    Leta Davis M.D.          PROGRESS NOTE:  Subjective:   Jose Chappell is a 72 y.o. female with known history of smoking 40 pack years, atherosclerosis of aorta seen on abdominal x-ray, runs of PVCs, dyslipidemia, hypertension Presenting today for follow-up evaluation of dyslipidemia.    Patient denied any complaints of exertional chest pain pressure or tightness. No complaints of palpitations orthopnea or PND. Denied any complaints of dizziness lightheadedness or LOC. Unclear what the patient is taking lisinopril or not but as of today blood pressure is well controlled. Patient has successfully quit smoking and also mentioned to me that she has lost weight in the process.    Past Medical History   Diagnosis Date   • Hypertension    • Hyperlipidemia    • Rheumatoid arthritis, adult (CMS-[facility])      Past Surgical History   Procedure Laterality Date   • Hysterectomy, vaginal     • Tonsillectomy       Family History   Problem Relation Age of Onset   • Other Mother      Breast cancer   • Lung Disease Father      COPD   • Heart Failure Sister    • Heart Attack Sister      MI at a59     History   Smoking status   • Current Every Day Smoker -- 1.00 packs/day for 40 years   • Types: Cigarettes   Smokeless tobacco   • Never Used     No Known Allergies  Outpatient Encounter Prescriptions as of 8/24/2017   Medication Sig Dispense Refill   • rosuvastatin (CRESTOR) 20 MG Tab Take 1 Tab by mouth every evening. 90 Tab 3   • escitalopram (LEXAPRO) 10 MG Tab Take 10 mg by mouth every day.     • [DISCONTINUED] ezetimibe (ZETIA) 10 MG Tab Take 1 Tab by mouth every day. 30 Tab 11   • [DISCONTINUED] metoprolol (LOPRESSOR) 50 MG Tab Take 1 Tab by mouth 2 times a day. 60 Tab 11   • montelukast  "(SINGULAIR) 10 MG Tab Take 10 mg by mouth every day.     • albuterol 108 (90 BASE) MCG/ACT Aero Soln inhalation aerosol Inhale 2 Puffs by mouth every 6 hours as needed for Shortness of Breath.     • alendronate (FOSAMAX) 70 MG Tab Take 70 mg by mouth every 7 days.     • infliximab (REMICADE) 100 MG Recon Soln by Intravenous route.     • tiotropium (SPIRIVA) 18 MCG Cap Inhale 18 mcg by mouth every day.     • [DISCONTINUED] lisinopril (PRINIVIL, ZESTRIL) 40 MG tablet Take 40 mg by mouth every day.       No facility-administered encounter medications on file as of 8/24/2017.     Review of Systems   Constitutional: Negative for malaise/fatigue.   HENT: Negative for tinnitus.    Eyes: Negative for blurred vision.   Respiratory: Negative for cough, hemoptysis and shortness of breath.    Cardiovascular: Negative for chest pain, palpitations, orthopnea, claudication, leg swelling and PND.   Gastrointestinal: Negative for nausea, vomiting, abdominal pain, blood in stool and melena.   Genitourinary: Negative for dysuria and hematuria.   Musculoskeletal: Positive for joint pain.   Skin: Negative for rash.   Neurological: Negative for dizziness, seizures, loss of consciousness, weakness and headaches.   Endo/Heme/Allergies: Does not bruise/bleed easily.   Psychiatric/Behavioral: Positive for depression. The patient is nervous/anxious.         Objective:   /80 mmHg  Pulse 62  Ht 1.651 m (5' 5\")  Wt 59.875 kg (132 lb)  BMI 21.97 kg/m2  SpO2 95%    Physical Exam   Constitutional: She is oriented to person, place, and time. She appears well-developed and well-nourished. No distress.   HENT:   Head: Atraumatic.   Eyes: Conjunctivae and EOM are normal. Pupils are equal, round, and reactive to light.   Neck: Neck supple. No JVD present.   Cardiovascular: Normal rate and regular rhythm.  Frequent extrasystoles are present.   No murmur heard.  Pulses:       Femoral pulses are 2+ on the right side, and 2+ on the left side.     "   Posterior tibial pulses are 1+ on the right side, and 1+ on the left side.   Pulmonary/Chest: Effort normal. No respiratory distress. She has no wheezes. She has no rales.   Decreased breath sounds bilaterally   Abdominal: Soft. There is no tenderness.   Musculoskeletal: She exhibits no edema.   Neurological: She is alert and oriented to person, place, and time.   Skin: Skin is warm and dry. She is not diaphoretic.   Psychiatric: She has a normal mood and affect.      Results for MICHELE DELA CRUZ (MRN [id number]) as of 1/30/2017 07:33   1/25/2017    Sodium 135   Potassium 3.9   Chloride 97   Co2 28   Anion Gap 10.0   Glucose 100 (H)   Bun 25 (H)   Creatinine 0.83   GFR If Non African American >60   Calcium 9.5   AST(SGOT) 23   ALT(SGPT) 23   Alkaline Phosphatase 64   Cholesterol,Tot 191   Triglycerides 103   HDL 48    (H)     Carotid doppler: 1/30/17  No previous duplex.  Very mild plaque of the bilateral carotid bifurcation. Doppler velocities are normal.   Bilateral subclavian and vertebral artery waveforms are antegrade and waveforms are normal in character and velocity    TTE:1/19/17  No prior study is available for comparison.   Left ventricular ejection fraction is visually estimated to be 55%.   Normal regional wall motion. Grade I diastolic dysfunction.  Mild mitral regurgitation.  Estimated right ventricular systolic pressure  is 29 mmHg. Right atrial pressure is estimated to be 3 mmHg.     Nuclear stress test: 1/25/17  Exercise capacity fair to good at 6-10 METS.   No ischemic changes with exercise.   No evidence of significant jeopardized viable myocardium or prior myocardial infarction.  Normal left ventricular size, ejection fraction, and wall motion.  ECG INTERPRETATION  Exercise capacity fair to good at 6-10 METS    Holter monitor: 10/5/16  Underlying rhythm sinus with a minimum heart rate of 48 bpm, average heart rate of 79 bpm with a maximum heart rate of 130 bpm  Total ventricular  ectopic beats 12.6%.  3 beat run of PVCs  Longest run of PSVT is 16 beats.  No symptoms documented.  16 beat run of PSVT     Labs: 16  Glucose 100, BUN 16, creatinine 0.75, bicarbonate 28.7, sodium 142, potassium 4.8, chloride 107  Alkaline phosphatase 67, ALT 38 AST 18  Triglycerides 59, total cholesterol 155, HDL 56, LDL 87    EK16  EKG personally reviewed by me.  Sinus rhythm normal axis LVH with repolarization abnormalities, PVC  Assessment:     1. Aortic atherosclerosis  2. Hypertension  3. Dyslipidemia  Medical Decision Making:  Today's Assessment / Status / Plan:     1.Patient was successful in quitting smoking.  Continue statins.  2. Unclear whether patient is on lisinopril or not. She is unable to tolerate metoprolol due to metallic taste.   Advised patient to contact our office and give us updated list of medications.   3. Continue Crestor 20 mg qHs.  Patient caught her blood work done in [place] will try to get those results.    Follow-up in one year.  Labs prior to next visit.    Thank you for allowing me to participate in taking care of patient.    Leta Davis. MD.        Lorena Rosario M.D.  [address] [facility] [address]  VIA Facsimile: [phone]

## 2017-11-01 NOTE — PROGRESS NOTES
Labs: 6/9/17  Glucose 103, BUN 15, creatinine 0.91, bicarbonate 28, sodium 135, potassium 3.9, chloride 99,  Alkaline phosphatase 66, ALT 30, AST 32  Triglycerides 73, total cholesterol 139, HDL 58, LDL 80

## 2018-10-08 ENCOUNTER — APPOINTMENT (OUTPATIENT)
Dept: RADIOLOGY | Facility: MEDICAL CENTER | Age: 73
DRG: 565 | End: 2018-10-08
Attending: EMERGENCY MEDICINE
Payer: OTHER MISCELLANEOUS

## 2018-10-08 ENCOUNTER — HOSPITAL ENCOUNTER (INPATIENT)
Facility: MEDICAL CENTER | Age: 73
LOS: 3 days | DRG: 565 | End: 2018-10-11
Attending: EMERGENCY MEDICINE | Admitting: SURGERY
Payer: OTHER MISCELLANEOUS

## 2018-10-08 DIAGNOSIS — S22.22XA CLOSED FRACTURE OF BODY OF STERNUM, INITIAL ENCOUNTER: ICD-10-CM

## 2018-10-08 DIAGNOSIS — J44.1 CHRONIC OBSTRUCTIVE PULMONARY DISEASE WITH ACUTE EXACERBATION (HCC): ICD-10-CM

## 2018-10-08 DIAGNOSIS — S22.22XA FRACTURE OF BODY OF STERNUM, INITIAL ENCOUNTER FOR CLOSED FRACTURE: ICD-10-CM

## 2018-10-08 DIAGNOSIS — J44.0 CHRONIC OBSTRUCTIVE PULMONARY DISEASE WITH ACUTE LOWER RESPIRATORY INFECTION (HCC): ICD-10-CM

## 2018-10-08 PROBLEM — J44.9 COPD (CHRONIC OBSTRUCTIVE PULMONARY DISEASE) (HCC): Status: ACTIVE | Noted: 2018-10-08
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
## 2018-10-08 PROBLEM — I10 HYPERTENSION: Status: ACTIVE | Noted: 2018-10-08

## 2018-10-08 LAB
ABO GROUP BLD: NORMAL
ALBUMIN SERPL BCP-MCNC: 4.1 G/DL (ref 3.2–4.9)
ALBUMIN/GLOB SERPL: 1.2 G/DL
ALP SERPL-CCNC: 70 U/L (ref 30–99)
ALT SERPL-CCNC: 23 U/L (ref 2–50)
ANION GAP SERPL CALC-SCNC: 9 MMOL/L (ref 0–11.9)
APTT PPP: 32.3 SEC (ref 24.7–36)
AST SERPL-CCNC: 25 U/L (ref 12–45)
BILIRUB SERPL-MCNC: 0.4 MG/DL (ref 0.1–1.5)
BLD GP AB SCN SERPL QL: NORMAL
BUN SERPL-MCNC: 17 MG/DL (ref 8–22)
CALCIUM SERPL-MCNC: 9.6 MG/DL (ref 8.5–10.5)
CHLORIDE SERPL-SCNC: 106 MMOL/L (ref 96–112)
CO2 SERPL-SCNC: 24 MMOL/L (ref 20–33)
CREAT SERPL-MCNC: 0.79 MG/DL (ref 0.5–1.4)
ERYTHROCYTE [DISTWIDTH] IN BLOOD BY AUTOMATED COUNT: 42.6 FL (ref 35.9–50)
ETHANOL BLD-MCNC: 0 G/DL
GLOBULIN SER CALC-MCNC: 3.4 G/DL (ref 1.9–3.5)
GLUCOSE SERPL-MCNC: 105 MG/DL (ref 65–99)
HCT VFR BLD AUTO: 48.8 % (ref 37–47)
HGB BLD-MCNC: 16.2 G/DL (ref 12–16)
INR PPP: 0.99 (ref 0.87–1.13)
MCH RBC QN AUTO: 30.9 PG (ref 27–33)
MCHC RBC AUTO-ENTMCNC: 33.2 G/DL (ref 33.6–35)
MCV RBC AUTO: 93.1 FL (ref 81.4–97.8)
PLATELET # BLD AUTO: 279 K/UL (ref 164–446)
PMV BLD AUTO: 9.5 FL (ref 9–12.9)
POTASSIUM SERPL-SCNC: 3.9 MMOL/L (ref 3.6–5.5)
PROT SERPL-MCNC: 7.5 G/DL (ref 6–8.2)
PROTHROMBIN TIME: 13.2 SEC (ref 12–14.6)
RBC # BLD AUTO: 5.24 M/UL (ref 4.2–5.4)
RH BLD: NORMAL
SODIUM SERPL-SCNC: 139 MMOL/L (ref 135–145)
WBC # BLD AUTO: 9.8 K/UL (ref 4.8–10.8)

## 2018-10-08 PROCEDURE — 85730 THROMBOPLASTIN TIME PARTIAL: CPT

## 2018-10-08 PROCEDURE — 85610 PROTHROMBIN TIME: CPT

## 2018-10-08 PROCEDURE — 85027 COMPLETE CBC AUTOMATED: CPT

## 2018-10-08 PROCEDURE — 71260 CT THORAX DX C+: CPT

## 2018-10-08 PROCEDURE — A9270 NON-COVERED ITEM OR SERVICE: HCPCS | Performed by: SURGERY

## 2018-10-08 PROCEDURE — 96374 THER/PROPH/DIAG INJ IV PUSH: CPT

## 2018-10-08 PROCEDURE — 700102 HCHG RX REV CODE 250 W/ 637 OVERRIDE(OP): Performed by: SURGERY

## 2018-10-08 PROCEDURE — 99285 EMERGENCY DEPT VISIT HI MDM: CPT

## 2018-10-08 PROCEDURE — 86900 BLOOD TYPING SEROLOGIC ABO: CPT

## 2018-10-08 PROCEDURE — 71045 X-RAY EXAM CHEST 1 VIEW: CPT

## 2018-10-08 PROCEDURE — 700111 HCHG RX REV CODE 636 W/ 250 OVERRIDE (IP): Performed by: EMERGENCY MEDICINE

## 2018-10-08 PROCEDURE — 700112 HCHG RX REV CODE 229: Performed by: SURGERY

## 2018-10-08 PROCEDURE — 770006 HCHG ROOM/CARE - MED/SURG/GYN SEMI*

## 2018-10-08 PROCEDURE — 86901 BLOOD TYPING SEROLOGIC RH(D): CPT

## 2018-10-08 PROCEDURE — 305948 HCHG GREEN TRAUMA ACT PRE-NOTIFY NO CC

## 2018-10-08 PROCEDURE — 700105 HCHG RX REV CODE 258: Performed by: SURGERY

## 2018-10-08 PROCEDURE — 700117 HCHG RX CONTRAST REV CODE 255: Performed by: EMERGENCY MEDICINE

## 2018-10-08 PROCEDURE — 72125 CT NECK SPINE W/O DYE: CPT

## 2018-10-08 PROCEDURE — 700111 HCHG RX REV CODE 636 W/ 250 OVERRIDE (IP): Performed by: SURGERY

## 2018-10-08 PROCEDURE — 70450 CT HEAD/BRAIN W/O DYE: CPT

## 2018-10-08 PROCEDURE — 72131 CT LUMBAR SPINE W/O DYE: CPT

## 2018-10-08 PROCEDURE — 96376 TX/PRO/DX INJ SAME DRUG ADON: CPT

## 2018-10-08 PROCEDURE — 80307 DRUG TEST PRSMV CHEM ANLYZR: CPT

## 2018-10-08 PROCEDURE — 72128 CT CHEST SPINE W/O DYE: CPT

## 2018-10-08 PROCEDURE — 86850 RBC ANTIBODY SCREEN: CPT

## 2018-10-08 PROCEDURE — 80053 COMPREHEN METABOLIC PANEL: CPT

## 2018-10-08 PROCEDURE — 304561 HCHG STAT O2

## 2018-10-08 RX ORDER — TIOTROPIUM BROMIDE 18 UG/1
1 CAPSULE ORAL; RESPIRATORY (INHALATION)
Status: DISCONTINUED | OUTPATIENT
Start: 2018-10-08 | End: 2018-10-11 | Stop reason: HOSPADM

## 2018-10-08 RX ORDER — ROSUVASTATIN CALCIUM 20 MG/1
20 TABLET, COATED ORAL EVERY EVENING
Status: DISCONTINUED | OUTPATIENT
Start: 2018-10-08 | End: 2018-10-11 | Stop reason: HOSPADM

## 2018-10-08 RX ORDER — KETOROLAC TROMETHAMINE 30 MG/ML
15 INJECTION, SOLUTION INTRAMUSCULAR; INTRAVENOUS EVERY 6 HOURS
Status: DISPENSED | OUTPATIENT
Start: 2018-10-08 | End: 2018-10-09

## 2018-10-08 RX ORDER — DOCUSATE SODIUM 100 MG/1
100 CAPSULE, LIQUID FILLED ORAL 2 TIMES DAILY
Status: DISCONTINUED | OUTPATIENT
Start: 2018-10-08 | End: 2018-10-11 | Stop reason: HOSPADM

## 2018-10-08 RX ORDER — ACETAMINOPHEN 650 MG/1
650 SUPPOSITORY RECTAL EVERY 4 HOURS PRN
Status: DISCONTINUED | OUTPATIENT
Start: 2018-10-08 | End: 2018-10-10

## 2018-10-08 RX ORDER — AMOXICILLIN 250 MG
1 CAPSULE ORAL
Status: DISCONTINUED | OUTPATIENT
Start: 2018-10-08 | End: 2018-10-11 | Stop reason: HOSPADM

## 2018-10-08 RX ORDER — CELECOXIB 200 MG/1
200 CAPSULE ORAL 2 TIMES DAILY WITH MEALS
Status: DISCONTINUED | OUTPATIENT
Start: 2018-10-09 | End: 2018-10-11 | Stop reason: HOSPADM

## 2018-10-08 RX ORDER — ACETAMINOPHEN 325 MG/1
650 TABLET ORAL EVERY 6 HOURS
Status: DISCONTINUED | OUTPATIENT
Start: 2018-10-08 | End: 2018-10-11 | Stop reason: HOSPADM

## 2018-10-08 RX ORDER — HYDROMORPHONE HYDROCHLORIDE 2 MG/ML
.5-1 INJECTION, SOLUTION INTRAMUSCULAR; INTRAVENOUS; SUBCUTANEOUS ONCE
Status: COMPLETED | OUTPATIENT
Start: 2018-10-08 | End: 2018-10-08

## 2018-10-08 RX ORDER — ACETAMINOPHEN 325 MG/1
650 TABLET ORAL EVERY 4 HOURS PRN
Status: DISCONTINUED | OUTPATIENT
Start: 2018-10-08 | End: 2018-10-11 | Stop reason: HOSPADM

## 2018-10-08 RX ORDER — MONTELUKAST SODIUM 10 MG/1
10 TABLET ORAL DAILY
Status: DISCONTINUED | OUTPATIENT
Start: 2018-10-09 | End: 2018-10-11 | Stop reason: HOSPADM

## 2018-10-08 RX ORDER — ALBUTEROL SULFATE 90 UG/1
2 AEROSOL, METERED RESPIRATORY (INHALATION) EVERY 6 HOURS PRN
Status: DISCONTINUED | OUTPATIENT
Start: 2018-10-08 | End: 2018-10-11 | Stop reason: HOSPADM

## 2018-10-08 RX ORDER — ALENDRONATE SODIUM 70 MG/1
70 TABLET ORAL
Status: DISCONTINUED | OUTPATIENT
Start: 2018-10-08 | End: 2018-10-08

## 2018-10-08 RX ORDER — SODIUM CHLORIDE, SODIUM LACTATE, POTASSIUM CHLORIDE, CALCIUM CHLORIDE 600; 310; 30; 20 MG/100ML; MG/100ML; MG/100ML; MG/100ML
INJECTION, SOLUTION INTRAVENOUS CONTINUOUS
Status: DISCONTINUED | OUTPATIENT
Start: 2018-10-08 | End: 2018-10-09

## 2018-10-08 RX ORDER — OXYCODONE HYDROCHLORIDE 10 MG/1
10 TABLET ORAL
Status: DISCONTINUED | OUTPATIENT
Start: 2018-10-08 | End: 2018-10-11 | Stop reason: HOSPADM

## 2018-10-08 RX ORDER — POLYETHYLENE GLYCOL 3350 17 G/17G
1 POWDER, FOR SOLUTION ORAL 2 TIMES DAILY
Status: DISCONTINUED | OUTPATIENT
Start: 2018-10-08 | End: 2018-10-11 | Stop reason: HOSPADM

## 2018-10-08 RX ORDER — MORPHINE SULFATE 4 MG/ML
2 INJECTION, SOLUTION INTRAMUSCULAR; INTRAVENOUS
Status: DISCONTINUED | OUTPATIENT
Start: 2018-10-08 | End: 2018-10-11 | Stop reason: HOSPADM

## 2018-10-08 RX ORDER — TIOTROPIUM BROMIDE 18 UG/1
1 CAPSULE ORAL; RESPIRATORY (INHALATION) DAILY
Status: DISCONTINUED | OUTPATIENT
Start: 2018-10-09 | End: 2018-10-08

## 2018-10-08 RX ORDER — IPRATROPIUM BROMIDE AND ALBUTEROL SULFATE 2.5; .5 MG/3ML; MG/3ML
3 SOLUTION RESPIRATORY (INHALATION)
Status: DISCONTINUED | OUTPATIENT
Start: 2018-10-08 | End: 2018-10-11 | Stop reason: HOSPADM

## 2018-10-08 RX ORDER — BISACODYL 10 MG
10 SUPPOSITORY, RECTAL RECTAL
Status: DISCONTINUED | OUTPATIENT
Start: 2018-10-08 | End: 2018-10-11 | Stop reason: HOSPADM

## 2018-10-08 RX ORDER — ENEMA 19; 7 G/133ML; G/133ML
1 ENEMA RECTAL
Status: DISCONTINUED | OUTPATIENT
Start: 2018-10-08 | End: 2018-10-11 | Stop reason: HOSPADM

## 2018-10-08 RX ORDER — AMOXICILLIN 250 MG
1 CAPSULE ORAL NIGHTLY
Status: DISCONTINUED | OUTPATIENT
Start: 2018-10-08 | End: 2018-10-11 | Stop reason: HOSPADM

## 2018-10-08 RX ORDER — OXYCODONE HYDROCHLORIDE 5 MG/1
5 TABLET ORAL
Status: DISCONTINUED | OUTPATIENT
Start: 2018-10-08 | End: 2018-10-11 | Stop reason: HOSPADM

## 2018-10-08 RX ORDER — ONDANSETRON 2 MG/ML
4 INJECTION INTRAMUSCULAR; INTRAVENOUS EVERY 4 HOURS PRN
Status: DISCONTINUED | OUTPATIENT
Start: 2018-10-08 | End: 2018-10-11 | Stop reason: HOSPADM

## 2018-10-08 RX ORDER — NICOTINE 21 MG/24HR
21 PATCH, TRANSDERMAL 24 HOURS TRANSDERMAL
Status: DISCONTINUED | OUTPATIENT
Start: 2018-10-09 | End: 2018-10-11 | Stop reason: HOSPADM

## 2018-10-08 RX ORDER — ESCITALOPRAM OXALATE 10 MG/1
10 TABLET ORAL DAILY
Status: DISCONTINUED | OUTPATIENT
Start: 2018-10-09 | End: 2018-10-11 | Stop reason: HOSPADM

## 2018-10-08 RX ADMIN — ROSUVASTATIN CALCIUM 20 MG: 20 TABLET, FILM COATED ORAL at 21:30

## 2018-10-08 RX ADMIN — HYDROMORPHONE HYDROCHLORIDE 1 MG: 2 INJECTION INTRAMUSCULAR; INTRAVENOUS; SUBCUTANEOUS at 19:30

## 2018-10-08 RX ADMIN — OXYCODONE HYDROCHLORIDE 10 MG: 10 TABLET ORAL at 21:31

## 2018-10-08 RX ADMIN — TIOTROPIUM BROMIDE 1 CAPSULE: 18 CAPSULE ORAL; RESPIRATORY (INHALATION) at 23:18

## 2018-10-08 RX ADMIN — ACETAMINOPHEN 650 MG: 325 TABLET, FILM COATED ORAL at 21:31

## 2018-10-08 RX ADMIN — ENOXAPARIN SODIUM 30 MG: 100 INJECTION SUBCUTANEOUS at 21:31

## 2018-10-08 RX ADMIN — HYDROMORPHONE HYDROCHLORIDE 0.5 MG: 1 INJECTION, SOLUTION INTRAMUSCULAR; INTRAVENOUS; SUBCUTANEOUS at 17:37

## 2018-10-08 RX ADMIN — DOCUSATE SODIUM 100 MG: 100 CAPSULE, LIQUID FILLED ORAL at 21:31

## 2018-10-08 RX ADMIN — STANDARDIZED SENNA CONCENTRATE AND DOCUSATE SODIUM 1 TABLET: 8.6; 5 TABLET, FILM COATED ORAL at 21:31

## 2018-10-08 RX ADMIN — SODIUM CHLORIDE, POTASSIUM CHLORIDE, SODIUM LACTATE AND CALCIUM CHLORIDE: 600; 310; 30; 20 INJECTION, SOLUTION INTRAVENOUS at 21:32

## 2018-10-08 RX ADMIN — IOHEXOL 100 ML: 350 INJECTION, SOLUTION INTRAVENOUS at 18:13

## 2018-10-08 RX ADMIN — KETOROLAC TROMETHAMINE 15 MG: 30 INJECTION, SOLUTION INTRAMUSCULAR at 21:31

## 2018-10-08 ASSESSMENT — PATIENT HEALTH QUESTIONNAIRE - PHQ9
1. LITTLE INTEREST OR PLEASURE IN DOING THINGS: NOT AT ALL
SUM OF ALL RESPONSES TO PHQ9 QUESTIONS 1 AND 2: 0
2. FEELING DOWN, DEPRESSED, IRRITABLE, OR HOPELESS: NOT AT ALL

## 2018-10-08 ASSESSMENT — PAIN SCALES - GENERAL
PAINLEVEL_OUTOF10: 9
PAINLEVEL_OUTOF10: 7
PAINLEVEL_OUTOF10: 5

## 2018-10-08 ASSESSMENT — LIFESTYLE VARIABLES
EVER_SMOKED: YES
ALCOHOL_USE: NO

## 2018-10-09 ENCOUNTER — APPOINTMENT (OUTPATIENT)
Dept: RADIOLOGY | Facility: MEDICAL CENTER | Age: 73
DRG: 565 | End: 2018-10-09
Attending: SURGERY
Payer: OTHER MISCELLANEOUS

## 2018-10-09 PROBLEM — I10 HYPERTENSION: Status: ACTIVE | Noted: 2018-10-09

## 2018-10-09 PROBLEM — Z78.9 NO CONTRAINDICATION TO DEEP VEIN THROMBOSIS (DVT) PROPHYLAXIS: Status: ACTIVE | Noted: 2018-10-09

## 2018-10-09 LAB
ABO GROUP BLD: NORMAL
ANION GAP SERPL CALC-SCNC: 7 MMOL/L (ref 0–11.9)
BASOPHILS # BLD AUTO: 0.6 % (ref 0–1.8)
BASOPHILS # BLD: 0.07 K/UL (ref 0–0.12)
BUN SERPL-MCNC: 15 MG/DL (ref 8–22)
CALCIUM SERPL-MCNC: 9.2 MG/DL (ref 8.5–10.5)
CHLORIDE SERPL-SCNC: 106 MMOL/L (ref 96–112)
CO2 SERPL-SCNC: 26 MMOL/L (ref 20–33)
CREAT SERPL-MCNC: 0.66 MG/DL (ref 0.5–1.4)
EOSINOPHIL # BLD AUTO: 0.13 K/UL (ref 0–0.51)
EOSINOPHIL NFR BLD: 1.1 % (ref 0–6.9)
ERYTHROCYTE [DISTWIDTH] IN BLOOD BY AUTOMATED COUNT: 42.6 FL (ref 35.9–50)
GLUCOSE SERPL-MCNC: 115 MG/DL (ref 65–99)
HCT VFR BLD AUTO: 44.3 % (ref 37–47)
HGB BLD-MCNC: 14.4 G/DL (ref 12–16)
IMM GRANULOCYTES # BLD AUTO: 0.04 K/UL (ref 0–0.11)
IMM GRANULOCYTES NFR BLD AUTO: 0.3 % (ref 0–0.9)
LYMPHOCYTES # BLD AUTO: 2.32 K/UL (ref 1–4.8)
LYMPHOCYTES NFR BLD: 19.8 % (ref 22–41)
MCH RBC QN AUTO: 30.4 PG (ref 27–33)
MCHC RBC AUTO-ENTMCNC: 32.5 G/DL (ref 33.6–35)
MCV RBC AUTO: 93.5 FL (ref 81.4–97.8)
MONOCYTES # BLD AUTO: 1.05 K/UL (ref 0–0.85)
MONOCYTES NFR BLD AUTO: 9 % (ref 0–13.4)
NEUTROPHILS # BLD AUTO: 8.11 K/UL (ref 2–7.15)
NEUTROPHILS NFR BLD: 69.2 % (ref 44–72)
NRBC # BLD AUTO: 0 K/UL
NRBC BLD-RTO: 0 /100 WBC
PLATELET # BLD AUTO: 250 K/UL (ref 164–446)
PMV BLD AUTO: 9.5 FL (ref 9–12.9)
POTASSIUM SERPL-SCNC: 3.8 MMOL/L (ref 3.6–5.5)
RBC # BLD AUTO: 4.74 M/UL (ref 4.2–5.4)
RH BLD: NORMAL
SODIUM SERPL-SCNC: 139 MMOL/L (ref 135–145)
WBC # BLD AUTO: 11.7 K/UL (ref 4.8–10.8)

## 2018-10-09 PROCEDURE — 700105 HCHG RX REV CODE 258: Performed by: SURGERY

## 2018-10-09 PROCEDURE — 770006 HCHG ROOM/CARE - MED/SURG/GYN SEMI*

## 2018-10-09 PROCEDURE — 80048 BASIC METABOLIC PNL TOTAL CA: CPT

## 2018-10-09 PROCEDURE — 700102 HCHG RX REV CODE 250 W/ 637 OVERRIDE(OP): Performed by: NURSE PRACTITIONER

## 2018-10-09 PROCEDURE — 99407 BEHAV CHNG SMOKING > 10 MIN: CPT

## 2018-10-09 PROCEDURE — 700111 HCHG RX REV CODE 636 W/ 250 OVERRIDE (IP): Performed by: SURGERY

## 2018-10-09 PROCEDURE — A9270 NON-COVERED ITEM OR SERVICE: HCPCS | Performed by: SURGERY

## 2018-10-09 PROCEDURE — 94668 MNPJ CHEST WALL SBSQ: CPT

## 2018-10-09 PROCEDURE — 85025 COMPLETE CBC W/AUTO DIFF WBC: CPT

## 2018-10-09 PROCEDURE — 71045 X-RAY EXAM CHEST 1 VIEW: CPT

## 2018-10-09 PROCEDURE — 700112 HCHG RX REV CODE 229: Performed by: SURGERY

## 2018-10-09 PROCEDURE — 700102 HCHG RX REV CODE 250 W/ 637 OVERRIDE(OP): Performed by: SURGERY

## 2018-10-09 PROCEDURE — A9270 NON-COVERED ITEM OR SERVICE: HCPCS | Performed by: NURSE PRACTITIONER

## 2018-10-09 PROCEDURE — 94667 MNPJ CHEST WALL 1ST: CPT

## 2018-10-09 PROCEDURE — 36415 COLL VENOUS BLD VENIPUNCTURE: CPT

## 2018-10-09 RX ORDER — AMLODIPINE BESYLATE 10 MG/1
5 TABLET ORAL
Status: DISCONTINUED | OUTPATIENT
Start: 2018-10-09 | End: 2018-10-10

## 2018-10-09 RX ADMIN — NICOTINE 21 MG: 21 PATCH, EXTENDED RELEASE TRANSDERMAL at 07:35

## 2018-10-09 RX ADMIN — OXYCODONE HYDROCHLORIDE 10 MG: 10 TABLET ORAL at 16:40

## 2018-10-09 RX ADMIN — OXYCODONE HYDROCHLORIDE 10 MG: 10 TABLET ORAL at 03:07

## 2018-10-09 RX ADMIN — ENOXAPARIN SODIUM 30 MG: 100 INJECTION SUBCUTANEOUS at 16:41

## 2018-10-09 RX ADMIN — POLYETHYLENE GLYCOL 3350 1 PACKET: 17 POWDER, FOR SOLUTION ORAL at 16:40

## 2018-10-09 RX ADMIN — ROSUVASTATIN CALCIUM 20 MG: 20 TABLET, FILM COATED ORAL at 16:40

## 2018-10-09 RX ADMIN — OXYCODONE HYDROCHLORIDE 10 MG: 10 TABLET ORAL at 21:42

## 2018-10-09 RX ADMIN — KETOROLAC TROMETHAMINE 15 MG: 30 INJECTION, SOLUTION INTRAMUSCULAR at 07:35

## 2018-10-09 RX ADMIN — DOCUSATE SODIUM 100 MG: 100 CAPSULE, LIQUID FILLED ORAL at 16:40

## 2018-10-09 RX ADMIN — ESCITALOPRAM OXALATE 10 MG: 10 TABLET ORAL at 07:35

## 2018-10-09 RX ADMIN — ACETAMINOPHEN 650 MG: 325 TABLET, FILM COATED ORAL at 16:40

## 2018-10-09 RX ADMIN — MONTELUKAST SODIUM 10 MG: 10 TABLET, COATED ORAL at 07:36

## 2018-10-09 RX ADMIN — ENOXAPARIN SODIUM 30 MG: 100 INJECTION SUBCUTANEOUS at 07:36

## 2018-10-09 RX ADMIN — KETOROLAC TROMETHAMINE 15 MG: 30 INJECTION, SOLUTION INTRAMUSCULAR at 11:33

## 2018-10-09 RX ADMIN — SODIUM CHLORIDE, POTASSIUM CHLORIDE, SODIUM LACTATE AND CALCIUM CHLORIDE: 600; 310; 30; 20 INJECTION, SOLUTION INTRAVENOUS at 07:39

## 2018-10-09 RX ADMIN — OXYCODONE HYDROCHLORIDE 10 MG: 10 TABLET ORAL at 07:36

## 2018-10-09 RX ADMIN — AMLODIPINE BESYLATE 5 MG: 10 TABLET ORAL at 17:21

## 2018-10-09 RX ADMIN — STANDARDIZED SENNA CONCENTRATE AND DOCUSATE SODIUM 1 TABLET: 8.6; 5 TABLET, FILM COATED ORAL at 21:42

## 2018-10-09 RX ADMIN — OXYCODONE HYDROCHLORIDE 10 MG: 10 TABLET ORAL at 11:32

## 2018-10-09 RX ADMIN — ACETAMINOPHEN 650 MG: 325 TABLET, FILM COATED ORAL at 11:33

## 2018-10-09 RX ADMIN — CELECOXIB 200 MG: 200 CAPSULE ORAL at 16:41

## 2018-10-09 RX ADMIN — ACETAMINOPHEN 650 MG: 325 TABLET, FILM COATED ORAL at 07:35

## 2018-10-09 RX ADMIN — DOCUSATE SODIUM 100 MG: 100 CAPSULE, LIQUID FILLED ORAL at 07:36

## 2018-10-09 RX ADMIN — TIOTROPIUM BROMIDE 1 CAPSULE: 18 CAPSULE ORAL; RESPIRATORY (INHALATION) at 21:42

## 2018-10-09 ASSESSMENT — COGNITIVE AND FUNCTIONAL STATUS - GENERAL
MOBILITY SCORE: 18
TURNING FROM BACK TO SIDE WHILE IN FLAT BAD: A LITTLE
PERSONAL GROOMING: A LITTLE
HELP NEEDED FOR BATHING: A LITTLE
SUGGESTED CMS G CODE MODIFIER MOBILITY: CK
STANDING UP FROM CHAIR USING ARMS: A LITTLE
WALKING IN HOSPITAL ROOM: A LITTLE
SUGGESTED CMS G CODE MODIFIER DAILY ACTIVITY: CK
MOVING TO AND FROM BED TO CHAIR: A LITTLE
EATING MEALS: A LITTLE
TOILETING: A LITTLE
DRESSING REGULAR UPPER BODY CLOTHING: A LITTLE
DAILY ACTIVITIY SCORE: 18
DRESSING REGULAR LOWER BODY CLOTHING: A LITTLE
MOVING FROM LYING ON BACK TO SITTING ON SIDE OF FLAT BED: A LITTLE
CLIMB 3 TO 5 STEPS WITH RAILING: A LITTLE

## 2018-10-09 ASSESSMENT — ENCOUNTER SYMPTOMS
VOMITING: 0
BACK PAIN: 0
ABDOMINAL PAIN: 0
CHILLS: 0
SHORTNESS OF BREATH: 1
SENSORY CHANGE: 0
FEVER: 0
NECK PAIN: 0
DOUBLE VISION: 0
FOCAL WEAKNESS: 0
MYALGIAS: 1
NAUSEA: 0
COUGH: 0
PALPITATIONS: 0
HEADACHES: 0

## 2018-10-09 ASSESSMENT — PAIN SCALES - GENERAL
PAINLEVEL_OUTOF10: 8
PAINLEVEL_OUTOF10: 7
PAINLEVEL_OUTOF10: 7
PAINLEVEL_OUTOF10: 8
PAINLEVEL_OUTOF10: 5
PAINLEVEL_OUTOF10: 4
PAINLEVEL_OUTOF10: 8

## 2018-10-09 ASSESSMENT — COPD QUESTIONNAIRES
DURING THE PAST 4 WEEKS HOW MUCH DID YOU FEEL SHORT OF BREATH: SOME OF THE TIME
DO YOU EVER COUGH UP ANY MUCUS OR PHLEGM?: YES, A FEW DAYS A WEEK OR MONTH
HAVE YOU SMOKED AT LEAST 100 CIGARETTES IN YOUR ENTIRE LIFE: YES
COPD SCREENING SCORE: 7
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
## 2018-10-09 NOTE — CARE PLAN
Problem: Communication  Goal: The ability to communicate needs accurately and effectively will improve  Outcome: PROGRESSING AS EXPECTED  Pt educated on the no smoking policy. All paraphernalia sent home with  and nicotine patch ordered.     Problem: Safety  Goal: Will remain free from injury  Outcome: PROGRESSING AS EXPECTED  Proper fall precautions in place. Call light within reach and encouraged to use. Hourly rounding in practice.
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
## 2018-10-09 NOTE — ASSESSMENT & PLAN NOTE
Premorbid, was taking Lisinopril but stopped 3 months ago due to side effects  10/9 Norvasc initiated   10/10 Inadequate control - increased
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
## 2018-10-09 NOTE — RESPIRATORY CARE
"COPD EDUCATION by COPD CLINICAL EDUCATOR  (Phone: [phone])  10/9/2018 at 9:57 AM by Osiris Ambrose     Patient seen by Respiratory Education team to complete Block 1 of a 2 part series. Reference material about the program was given to patient along with our contact information.  Part #1 includes: What is COPD (how the lungs work), common treatments for COPD, the difference between \"rescue\" medications and \"daily\" medications, bronchial hygiene, and explanation of the Action Plan. We discussed appropriate medication technique along with a demonstration, and the correct way to care for and clean equipment. Advance directives and smoking cessation were discussed as appropriate to this patient. Question and answer session followed.     "

## 2018-10-09 NOTE — ASSESSMENT & PLAN NOTE
Mildly displaced sternal fracture with a small underlying hematoma.  Aggressive pulmonary hygiene and multimodal pain management and serial chest radiography.
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
## 2018-10-09 NOTE — ED NOTES
Report given to sree, transport here to  patient. Family with patient, son calm and cooperative. Now.

## 2018-10-09 NOTE — ASSESSMENT & PLAN NOTE
Chronic condition treated with Spiriva and Singulair.  Resumed maintenance medication.  Respiratory protocol

## 2018-10-09 NOTE — ASSESSMENT & PLAN NOTE
Chemical DVT prophylaxis (Lovenox) initiated upon admission.  Ambulate TID.  Trauma duplex as clinically indicated.

## 2018-10-09 NOTE — PROGRESS NOTES
Patient /103  Medicated for pain.   Updated Meche MORILLO.   Patient reports she used to take lisinopril and stopped 3 months ago due to the medication making her dizzy and nauseated.
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
## 2018-10-10 ENCOUNTER — APPOINTMENT (OUTPATIENT)
Dept: RADIOLOGY | Facility: MEDICAL CENTER | Age: 73
DRG: 565 | End: 2018-10-10
Attending: SURGERY
Payer: OTHER MISCELLANEOUS

## 2018-10-10 ENCOUNTER — PATIENT OUTREACH (OUTPATIENT)
Dept: HEALTH INFORMATION MANAGEMENT | Facility: OTHER | Age: 73
End: 2018-10-10

## 2018-10-10 LAB
ANION GAP SERPL CALC-SCNC: 3 MMOL/L (ref 0–11.9)
BASOPHILS # BLD AUTO: 0.8 % (ref 0–1.8)
BASOPHILS # BLD: 0.07 K/UL (ref 0–0.12)
BUN SERPL-MCNC: 15 MG/DL (ref 8–22)
CALCIUM SERPL-MCNC: 8.6 MG/DL (ref 8.5–10.5)
CHLORIDE SERPL-SCNC: 107 MMOL/L (ref 96–112)
CO2 SERPL-SCNC: 28 MMOL/L (ref 20–33)
CREAT SERPL-MCNC: 0.78 MG/DL (ref 0.5–1.4)
EOSINOPHIL # BLD AUTO: 0.33 K/UL (ref 0–0.51)
EOSINOPHIL NFR BLD: 3.7 % (ref 0–6.9)
ERYTHROCYTE [DISTWIDTH] IN BLOOD BY AUTOMATED COUNT: 43 FL (ref 35.9–50)
GLUCOSE SERPL-MCNC: 92 MG/DL (ref 65–99)
HCT VFR BLD AUTO: 45.4 % (ref 37–47)
HGB BLD-MCNC: 14.4 G/DL (ref 12–16)
IMM GRANULOCYTES # BLD AUTO: 0.02 K/UL (ref 0–0.11)
IMM GRANULOCYTES NFR BLD AUTO: 0.2 % (ref 0–0.9)
LYMPHOCYTES # BLD AUTO: 2.4 K/UL (ref 1–4.8)
LYMPHOCYTES NFR BLD: 27 % (ref 22–41)
MCH RBC QN AUTO: 30.2 PG (ref 27–33)
MCHC RBC AUTO-ENTMCNC: 31.7 G/DL (ref 33.6–35)
MCV RBC AUTO: 95.2 FL (ref 81.4–97.8)
MONOCYTES # BLD AUTO: 0.99 K/UL (ref 0–0.85)
MONOCYTES NFR BLD AUTO: 11.1 % (ref 0–13.4)
NEUTROPHILS # BLD AUTO: 5.09 K/UL (ref 2–7.15)
NEUTROPHILS NFR BLD: 57.2 % (ref 44–72)
NRBC # BLD AUTO: 0 K/UL
NRBC BLD-RTO: 0 /100 WBC
PLATELET # BLD AUTO: 237 K/UL (ref 164–446)
PMV BLD AUTO: 9.6 FL (ref 9–12.9)
POTASSIUM SERPL-SCNC: 3.9 MMOL/L (ref 3.6–5.5)
RBC # BLD AUTO: 4.77 M/UL (ref 4.2–5.4)
SODIUM SERPL-SCNC: 138 MMOL/L (ref 135–145)
WBC # BLD AUTO: 8.9 K/UL (ref 4.8–10.8)

## 2018-10-10 PROCEDURE — A9270 NON-COVERED ITEM OR SERVICE: HCPCS | Performed by: SURGERY

## 2018-10-10 PROCEDURE — 700102 HCHG RX REV CODE 250 W/ 637 OVERRIDE(OP): Performed by: SURGERY

## 2018-10-10 PROCEDURE — 94668 MNPJ CHEST WALL SBSQ: CPT

## 2018-10-10 PROCEDURE — G8987 SELF CARE CURRENT STATUS: HCPCS | Mod: CJ

## 2018-10-10 PROCEDURE — G8988 SELF CARE GOAL STATUS: HCPCS | Mod: CI

## 2018-10-10 PROCEDURE — G8978 MOBILITY CURRENT STATUS: HCPCS | Mod: CJ

## 2018-10-10 PROCEDURE — 36415 COLL VENOUS BLD VENIPUNCTURE: CPT

## 2018-10-10 PROCEDURE — G8979 MOBILITY GOAL STATUS: HCPCS | Mod: CI

## 2018-10-10 PROCEDURE — 700102 HCHG RX REV CODE 250 W/ 637 OVERRIDE(OP): Performed by: NURSE PRACTITIONER

## 2018-10-10 PROCEDURE — A9270 NON-COVERED ITEM OR SERVICE: HCPCS | Performed by: NURSE PRACTITIONER

## 2018-10-10 PROCEDURE — 700112 HCHG RX REV CODE 229: Performed by: SURGERY

## 2018-10-10 PROCEDURE — 700111 HCHG RX REV CODE 636 W/ 250 OVERRIDE (IP): Performed by: SURGERY

## 2018-10-10 PROCEDURE — 97165 OT EVAL LOW COMPLEX 30 MIN: CPT

## 2018-10-10 PROCEDURE — 80048 BASIC METABOLIC PNL TOTAL CA: CPT

## 2018-10-10 PROCEDURE — 97162 PT EVAL MOD COMPLEX 30 MIN: CPT

## 2018-10-10 PROCEDURE — 71045 X-RAY EXAM CHEST 1 VIEW: CPT

## 2018-10-10 PROCEDURE — 99407 BEHAV CHNG SMOKING > 10 MIN: CPT

## 2018-10-10 PROCEDURE — 770006 HCHG ROOM/CARE - MED/SURG/GYN SEMI*

## 2018-10-10 PROCEDURE — 85025 COMPLETE CBC W/AUTO DIFF WBC: CPT

## 2018-10-10 RX ORDER — DIAZEPAM 2 MG/1
2 TABLET ORAL EVERY 6 HOURS PRN
Status: DISCONTINUED | OUTPATIENT
Start: 2018-10-10 | End: 2018-10-11 | Stop reason: HOSPADM

## 2018-10-10 RX ORDER — AMLODIPINE BESYLATE 10 MG/1
10 TABLET ORAL
Status: DISCONTINUED | OUTPATIENT
Start: 2018-10-11 | End: 2018-10-11 | Stop reason: HOSPADM

## 2018-10-10 RX ORDER — AMLODIPINE BESYLATE 10 MG/1
5 TABLET ORAL ONCE
Status: COMPLETED | OUTPATIENT
Start: 2018-10-10 | End: 2018-10-10

## 2018-10-10 RX ADMIN — ONDANSETRON 4 MG: 2 INJECTION INTRAMUSCULAR; INTRAVENOUS at 07:24

## 2018-10-10 RX ADMIN — ACETAMINOPHEN 650 MG: 325 TABLET, FILM COATED ORAL at 17:30

## 2018-10-10 RX ADMIN — NICOTINE 21 MG: 21 PATCH, EXTENDED RELEASE TRANSDERMAL at 04:51

## 2018-10-10 RX ADMIN — CELECOXIB 200 MG: 200 CAPSULE ORAL at 04:51

## 2018-10-10 RX ADMIN — AMLODIPINE BESYLATE 5 MG: 10 TABLET ORAL at 09:39

## 2018-10-10 RX ADMIN — ESCITALOPRAM OXALATE 10 MG: 10 TABLET ORAL at 04:50

## 2018-10-10 RX ADMIN — OXYCODONE HYDROCHLORIDE 10 MG: 10 TABLET ORAL at 09:41

## 2018-10-10 RX ADMIN — OXYCODONE HYDROCHLORIDE 10 MG: 10 TABLET ORAL at 04:50

## 2018-10-10 RX ADMIN — ACETAMINOPHEN 650 MG: 325 TABLET, FILM COATED ORAL at 04:50

## 2018-10-10 RX ADMIN — ACETAMINOPHEN 650 MG: 325 TABLET, FILM COATED ORAL at 12:37

## 2018-10-10 RX ADMIN — CELECOXIB 200 MG: 200 CAPSULE ORAL at 17:30

## 2018-10-10 RX ADMIN — ROSUVASTATIN CALCIUM 20 MG: 20 TABLET, FILM COATED ORAL at 17:31

## 2018-10-10 RX ADMIN — DOCUSATE SODIUM 100 MG: 100 CAPSULE, LIQUID FILLED ORAL at 17:31

## 2018-10-10 RX ADMIN — TIOTROPIUM BROMIDE 1 CAPSULE: 18 CAPSULE ORAL; RESPIRATORY (INHALATION) at 21:43

## 2018-10-10 RX ADMIN — MONTELUKAST SODIUM 10 MG: 10 TABLET, COATED ORAL at 04:50

## 2018-10-10 RX ADMIN — AMLODIPINE BESYLATE 5 MG: 10 TABLET ORAL at 04:50

## 2018-10-10 RX ADMIN — DOCUSATE SODIUM 100 MG: 100 CAPSULE, LIQUID FILLED ORAL at 04:50

## 2018-10-10 RX ADMIN — ENOXAPARIN SODIUM 30 MG: 100 INJECTION SUBCUTANEOUS at 17:31

## 2018-10-10 RX ADMIN — OXYCODONE HYDROCHLORIDE 10 MG: 10 TABLET ORAL at 21:43

## 2018-10-10 RX ADMIN — ENOXAPARIN SODIUM 30 MG: 100 INJECTION SUBCUTANEOUS at 04:49

## 2018-10-10 RX ADMIN — POLYETHYLENE GLYCOL 3350 1 PACKET: 17 POWDER, FOR SOLUTION ORAL at 17:31

## 2018-10-10 RX ADMIN — OXYCODONE HYDROCHLORIDE 10 MG: 10 TABLET ORAL at 16:07

## 2018-10-10 ASSESSMENT — PAIN SCALES - GENERAL
PAINLEVEL_OUTOF10: 7

## 2018-10-10 ASSESSMENT — COGNITIVE AND FUNCTIONAL STATUS - GENERAL
HELP NEEDED FOR BATHING: A LITTLE
TOILETING: A LITTLE
WALKING IN HOSPITAL ROOM: A LITTLE
SUGGESTED CMS G CODE MODIFIER MOBILITY: CL
DAILY ACTIVITIY SCORE: 21
MOVING FROM LYING ON BACK TO SITTING ON SIDE OF FLAT BED: UNABLE
MOBILITY SCORE: 12
STANDING UP FROM CHAIR USING ARMS: A LITTLE
CLIMB 3 TO 5 STEPS WITH RAILING: A LOT
MOVING TO AND FROM BED TO CHAIR: UNABLE
SUGGESTED CMS G CODE MODIFIER DAILY ACTIVITY: CJ
TURNING FROM BACK TO SIDE WHILE IN FLAT BAD: A LOT
DRESSING REGULAR UPPER BODY CLOTHING: A LITTLE

## 2018-10-10 ASSESSMENT — ENCOUNTER SYMPTOMS
MYALGIAS: 1
COUGH: 0
CONSTITUTIONAL NEGATIVE: 1
CARDIOVASCULAR NEGATIVE: 1
NERVOUS/ANXIOUS: 1
GASTROINTESTINAL NEGATIVE: 1
SHORTNESS OF BREATH: 1
EYES NEGATIVE: 1
NEUROLOGICAL NEGATIVE: 1

## 2018-10-10 ASSESSMENT — GAIT ASSESSMENTS
GAIT LEVEL OF ASSIST: CONTACT GUARD ASSIST
DISTANCE (FEET): 60
DEVIATION: ANTALGIC;DECREASED BASE OF SUPPORT
ASSISTIVE DEVICE: FRONT WHEEL WALKER

## 2018-10-10 ASSESSMENT — ACTIVITIES OF DAILY LIVING (ADL): TOILETING: INDEPENDENT

## 2018-10-10 NOTE — RESPIRATORY CARE
"COPD EDUCATION by COPD CLINICAL EDUCATOR  (Phone: [phone])  10/10/2018 at 12:15 PM by Osiris Ambrose    Patient seen by Respiratory Education team to complete the final block of education.  This session discussed signs and symptoms of an exacerbation (flare-up), triggers that can create flare-ups, reiteration of the \"Action Plan\" to refer to daily which will help categorize their symptoms in order to utilize the appropriate therapy, breathing techniques used to treat acute symptoms, and oxygen safety. Smoking Cessation was discussed as appropriate to this patient. Question and answer session followed.  "

## 2018-10-10 NOTE — PROGRESS NOTES
"Report received from night RN, assumed Care.   Patient is AOx4, responds appropriately.      Pain controlled at this time with oral PRN medication.  Intermittent HTN which appears to be exacerbated by pain/ambulation/anxiety.    New BP medication administered, increased dose- educated patient.  Medicated for pain.  IV SL.   Patient is tolerating regular diet, denies nausea/vomiting. + flatus, awaiting BM- \"feel constipated\"- patient would like suppository next time she goes to the bathroom.  Up self with steady gait.  IS max pull 1000- improvement from 500 yesterday.  Weaning off of O2- 0.5L silicone NC.  Encouraged turn cough deep breathe.    Plan of care discussed, all questions answered.    Explained importance of calling before getting OOB and pt verbalizes understanding.       Call light and belongings within reach, treaded slipper socks on, SCD in use, bed in lowest locked position.  Hourly rounding in place, all needs met at this time  "

## 2018-10-10 NOTE — CARE PLAN
Problem: Safety  Goal: Will remain free from injury  Outcome: PROGRESSING AS EXPECTED  Proper fall precautions in place. Call light within reach and encouraged to use. Hourly rounding in practice.    Problem: Pain Management  Goal: Pain level will decrease to patient's comfort goal  Outcome: PROGRESSING AS EXPECTED  Pt taking Oxy 10mg PRN for mild-moderate pain. Asking for appropriately. Hourly rounding in place.
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
## 2018-10-10 NOTE — THERAPY
"Physical Therapy Evaluation completed.   Bed Mobility:  Supine to Sit: Minimal Assist  Transfers: Sit to Stand: Stand by Assist  Gait: Level Of Assist: Contact Guard Assist with Front-Wheel Walker       Plan of Care: Will benefit from Physical Therapy 5 times per week  Discharge Recommendations: Equipment: Front-Wheel Walker. Post-acute therapy Discharge to home with outpatient or home health for additional skilled therapy services.    See \"Rehab Therapy-Acute\" Patient Summary Report for complete documentation.     "
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
## 2018-10-11 ENCOUNTER — APPOINTMENT (OUTPATIENT)
Dept: RADIOLOGY | Facility: MEDICAL CENTER | Age: 73
DRG: 565 | End: 2018-10-11
Attending: SURGERY
Payer: OTHER MISCELLANEOUS

## 2018-10-11 VITALS
DIASTOLIC BLOOD PRESSURE: 68 MMHG | HEART RATE: 71 BPM | OXYGEN SATURATION: 91 % | HEIGHT: 66 IN | TEMPERATURE: 98.9 F | BODY MASS INDEX: 20.89 KG/M2 | SYSTOLIC BLOOD PRESSURE: 131 MMHG | WEIGHT: 130 LBS | RESPIRATION RATE: 17 BRPM

## 2018-10-11 PROCEDURE — A9270 NON-COVERED ITEM OR SERVICE: HCPCS | Performed by: SURGERY

## 2018-10-11 PROCEDURE — 700102 HCHG RX REV CODE 250 W/ 637 OVERRIDE(OP): Performed by: SURGERY

## 2018-10-11 PROCEDURE — 97530 THERAPEUTIC ACTIVITIES: CPT

## 2018-10-11 PROCEDURE — 700112 HCHG RX REV CODE 229: Performed by: SURGERY

## 2018-10-11 PROCEDURE — A9270 NON-COVERED ITEM OR SERVICE: HCPCS | Performed by: NURSE PRACTITIONER

## 2018-10-11 PROCEDURE — 99407 BEHAV CHNG SMOKING > 10 MIN: CPT

## 2018-10-11 PROCEDURE — 700111 HCHG RX REV CODE 636 W/ 250 OVERRIDE (IP): Performed by: SURGERY

## 2018-10-11 PROCEDURE — 97535 SELF CARE MNGMENT TRAINING: CPT

## 2018-10-11 PROCEDURE — 71045 X-RAY EXAM CHEST 1 VIEW: CPT

## 2018-10-11 PROCEDURE — 700102 HCHG RX REV CODE 250 W/ 637 OVERRIDE(OP): Performed by: NURSE PRACTITIONER

## 2018-10-11 RX ORDER — NICOTINE 21 MG/24HR
1 PATCH, TRANSDERMAL 24 HOURS TRANSDERMAL EVERY 24 HOURS
Qty: 30 PATCH | Refills: 0 | Status: SHIPPED | OUTPATIENT
Start: 2018-10-11 | End: 2018-12-07

## 2018-10-11 RX ORDER — AMLODIPINE BESYLATE 10 MG/1
10 TABLET ORAL DAILY
Qty: 30 TAB | Refills: 1 | Status: SHIPPED | OUTPATIENT
Start: 2018-10-12 | End: 2018-10-12

## 2018-10-11 RX ORDER — OXYCODONE HYDROCHLORIDE 10 MG/1
5-10 TABLET ORAL EVERY 4 HOURS PRN
Qty: 30 TAB | Refills: 0 | Status: SHIPPED | OUTPATIENT
Start: 2018-10-11 | End: 2018-10-18

## 2018-10-11 RX ADMIN — MAGNESIUM HYDROXIDE 30 ML: 400 SUSPENSION ORAL at 08:35

## 2018-10-11 RX ADMIN — DOCUSATE SODIUM 100 MG: 100 CAPSULE, LIQUID FILLED ORAL at 06:56

## 2018-10-11 RX ADMIN — OXYCODONE HYDROCHLORIDE 10 MG: 10 TABLET ORAL at 10:45

## 2018-10-11 RX ADMIN — ESCITALOPRAM OXALATE 10 MG: 10 TABLET ORAL at 08:35

## 2018-10-11 RX ADMIN — ACETAMINOPHEN 650 MG: 325 TABLET, FILM COATED ORAL at 08:34

## 2018-10-11 RX ADMIN — POLYETHYLENE GLYCOL 3350 1 PACKET: 17 POWDER, FOR SOLUTION ORAL at 16:58

## 2018-10-11 RX ADMIN — BISACODYL 10 MG: 10 SUPPOSITORY RECTAL at 08:40

## 2018-10-11 RX ADMIN — MONTELUKAST SODIUM 10 MG: 10 TABLET, COATED ORAL at 08:34

## 2018-10-11 RX ADMIN — OXYCODONE HYDROCHLORIDE 10 MG: 10 TABLET ORAL at 06:57

## 2018-10-11 RX ADMIN — OXYCODONE HYDROCHLORIDE 10 MG: 10 TABLET ORAL at 15:49

## 2018-10-11 RX ADMIN — ACETAMINOPHEN 650 MG: 325 TABLET, FILM COATED ORAL at 12:23

## 2018-10-11 RX ADMIN — DOCUSATE SODIUM 100 MG: 100 CAPSULE, LIQUID FILLED ORAL at 16:58

## 2018-10-11 RX ADMIN — ENOXAPARIN SODIUM 30 MG: 100 INJECTION SUBCUTANEOUS at 16:59

## 2018-10-11 RX ADMIN — CELECOXIB 200 MG: 200 CAPSULE ORAL at 08:34

## 2018-10-11 RX ADMIN — CELECOXIB 200 MG: 200 CAPSULE ORAL at 16:58

## 2018-10-11 RX ADMIN — AMLODIPINE BESYLATE 10 MG: 10 TABLET ORAL at 06:56

## 2018-10-11 RX ADMIN — POLYETHYLENE GLYCOL 3350 1 PACKET: 17 POWDER, FOR SOLUTION ORAL at 06:56

## 2018-10-11 RX ADMIN — ACETAMINOPHEN 650 MG: 325 TABLET, FILM COATED ORAL at 16:59

## 2018-10-11 RX ADMIN — ENOXAPARIN SODIUM 30 MG: 100 INJECTION SUBCUTANEOUS at 08:35

## 2018-10-11 RX ADMIN — NICOTINE 21 MG: 21 PATCH, EXTENDED RELEASE TRANSDERMAL at 06:59

## 2018-10-11 RX ADMIN — ROSUVASTATIN CALCIUM 20 MG: 20 TABLET, FILM COATED ORAL at 16:59

## 2018-10-11 ASSESSMENT — ENCOUNTER SYMPTOMS
NEUROLOGICAL NEGATIVE: 1
NERVOUS/ANXIOUS: 1
CARDIOVASCULAR NEGATIVE: 1
GASTROINTESTINAL NEGATIVE: 1
COUGH: 0
MYALGIAS: 1
SHORTNESS OF BREATH: 1
EYES NEGATIVE: 1
CONSTITUTIONAL NEGATIVE: 1

## 2018-10-11 ASSESSMENT — COGNITIVE AND FUNCTIONAL STATUS - GENERAL
DAILY ACTIVITIY SCORE: 21
DRESSING REGULAR UPPER BODY CLOTHING: A LITTLE
SUGGESTED CMS G CODE MODIFIER DAILY ACTIVITY: CJ
TOILETING: A LITTLE
HELP NEEDED FOR BATHING: A LITTLE

## 2018-10-11 ASSESSMENT — PAIN SCALES - GENERAL
PAINLEVEL_OUTOF10: 8
PAINLEVEL_OUTOF10: 7
PAINLEVEL_OUTOF10: 7

## 2018-10-11 NOTE — DISCHARGE PLANNING
Received Choice form at 1:29 pm  Agency/Facility Name: [facility]   Referral sent per Choice form @ 1:29 pm.

## 2018-10-11 NOTE — PROGRESS NOTES
Oxygen delivered to bedside.   Evening  states insurance will cover once COPD diagnosis added.   Updated ILIA Nguyen on need for amlodipine prescription for discharge- added and sent to [facility] in [place]- patient states she will  the medication  Patients son on way to  the patient.

## 2018-10-11 NOTE — THERAPY
"Occupational Therapy Treatment completed with focus on ADLs and ADL transfers.  Functional Status:    Pt in bed when received by OT. Demonstrated sup>sit>stand with supv. Practiced donning gown, requiring min A 2/2 diminished BUE ROM. Completed toilet xfer with SBA and standing grooming with supv.  Plan of Care: Will benefit from Occupational Therapy 5 times per week  Discharge Recommendations:  Equipment Will Continue to Assess for Equipment Needs. Post-acute therapy Discharge to home with outpatient or home health for additional skilled therapy services.    See \"Rehab Therapy-Acute\" Patient Summary Report for complete documentation.       Pt seen for OT treatment this afternoon. Pt's mobility has improved since time of eval and she currently completes ADL xfers and SBA level - pt is more steady on her feet and compliant with use of FWW. Required Min A for UB dressing and will likely require assist with bathing. Pt reports that she plans to purchase a shower chair for home. Will continue working with pt in this setting to work towards established goals. Would benefit from  OT.  "

## 2018-10-11 NOTE — RESPIRATORY CARE
COPD EDUCATION by COPD CLINICAL EDUCATOR  10/11/2018 at 1:21 PM by Shannon Castillo     Patient interviewed by COPD education team. We reviewed Josephine 2 information and reviewed medications for use at home. We practiced Deep Breath and cough positions with splinting.  She has decided to not smoke anymore and we reviewed this as well

## 2018-10-11 NOTE — DISCHARGE PLANNING
Received Choice form at 1:52 pm  Agency/Facility Name: [facility]  Referral sent per Choice form @ 1:52 pm.

## 2018-10-11 NOTE — DISCHARGE INSTRUCTIONS
Discharge Instructions    Discharged to home by car with relative. Discharged via wheelchair, hospital escort: Yes.  Special equipment needed: Oxygen; Walker    Be sure to schedule a follow-up appointment with your primary care doctor or any specialists as instructed.     Discharge Plan:   Diet Plan: Discussed  Activity Level: Discussed  Smoking Cessation Offered: Patient Refused  Confirmed Follow up Appointment: Patient to Call and Schedule Appointment  Confirmed Symptoms Management: Discussed  Medication Reconciliation Updated: Yes  Influenza Vaccine Indication: Patient Refuses    I understand that a diet low in cholesterol, fat, and sodium is recommended for good health. Unless I have been given specific instructions below for another diet, I accept this instruction as my diet prescription.   Other diet: follow your regular diet as tolerated    Special Instructions: None    · Is patient discharged on Warfarin / Coumadin?   No     Depression / Suicide Risk    As you are discharged from this [facility] facility, it is important to learn how to keep safe from harming yourself.    Recognize the warning signs:  · Abrupt changes in personality, positive or negative- including increase in energy   · Giving away possessions  · Change in eating patterns- significant weight changes-  positive or negative  · Change in sleeping patterns- unable to sleep or sleeping all the time   · Unwillingness or inability to communicate  · Depression  · Unusual sadness, discouragement and loneliness  · Talk of wanting to die  · Neglect of personal appearance   · Rebelliousness- reckless behavior  · Withdrawal from people/activities they love  · Confusion- inability to concentrate     If you or a loved one observes any of these behaviors or has concerns about self-harm, here's what you can do:  · Talk about it- your feelings and reasons for harming yourself  · Remove any means that you might use to hurt yourself (examples: pills, rope,  extension cords, firearm)  · Get professional help from the community (Mental Health, Substance Abuse, psychological counseling)  · Do not be alone:Call your Safe Contact- someone whom you trust who will be there for you.  · Call your local CRISIS HOTLINE 383-9322 or 492-535-0475  · Call your local Children's Mobile Crisis Response Team [place] (402) 554-3028 or www.Fandium  · Call the toll free National Suicide Prevention Hotlines   · National Suicide Prevention Lifeline 423-979-QQJD (5783)  · [facility] Hope [facility] Network 800-SUICIDE (121-0454)    - Call or seek medical attention for questions or concerns   - Follow up with Dr. Campos in 1-2 weeks time   - Follow up with primary care provider within one weeks time   - Resume regular diet   - May take over the counter acetaminophen or ibuprofen as needed for pain   - Continue daily over the counter stool softener while on narcotics   - No operation of machinery or motorized vehicles while under the influence of narcotics   - No alcohol use while under the influence of narcotics   - No swimming, hot tubs, baths or wound submersion until cleared by outpatient provider. May shower   - No contact sports, strenuous activities, or heavy lifting until cleared by outpatient provider   - If respiratory decompensation, change in condition or worsening pain, or signs or symptoms of infection occur seek medical attention      Sternal Fracture  A sternal fracture is a break in the bone in the center of your chest (sternum or breastbone). This fracture is not dangerous unless there is also an injury to your heart or lungs, which are protected by the sternum and ribs.  What are the causes?  This condition is usually caused by a forceful injury from:  · Motor vehicle collisions. This is the most common cause.  · Contact sports.  · Physical assaults.  You can also have a sternal fracture without having a forceful injury if the bone becomes weakened over time (stress  fracture or insufficiency fracture).  What increases the risk?  You may be at greater risk for a sternal fracture if you:  · Participate in direct contact sports, such as football or wrestling.  · Work at elevated heights, such as in construction.  Other risk factors for a stress or insufficiency sternal fracture include:  · Being female.  · Being a postmenopausal woman.  · Being age 50 or older.  · Having osteoporosis.  · Having severe curvature of the spine.  · Being on long-term steroid treatment.  What are the signs or symptoms?  Symptoms of this condition include:  · Pain over the sternum.  · Pain when pressing on the sternum.  · Pain that gets worse with deep breathing or coughing.  · Shortness of breath.  · Bruising.  · Swelling.  · A crackling sound when taking a deep breath or pressing on the sternum.  How is this diagnosed?  This condition is diagnosed with a medical history and physical exam. You may also have imaging tests, including:  · CT scan.  · Ultrasound.  · Chest X-rays that are taken from a side view.  Your health care provider may check your blood oxygen level with a pulse oximetry test. You may also have repeated electrocardiograms (ECGs) to make sure that your heart has not been injured. You may also have a blood test to check for damage to your heart muscle.  How is this treated?  Treatment depends on the severity of your injury. A sternal fracture without any other injury (isolated sternal fracture) usually heals without treatment. You may need to limit (restrict) some activities at home and take medicine for pain relief.  In rare cases, you may need surgery to repair a sternal fracture that continues to cause severe pain or a sternal fracture that involves bones that have been moved out of position considerably (displaced fracture).  Follow these instructions at home:  · Take over-the-counter and prescription medicines only as told by your health care provider.  · Rest at home. Return to  your normal activities as told by your health care provider. Ask your health care provider what activities are safe for you.  · If directed, apply ice to the injured area:  ¨ Put ice in a plastic bag.  ¨ Place a towel between your skin and the bag.  ¨ Leave the ice on for 20 minutes, 2-3 times a day.  · Do not lift anything that is heavier than 10 lb (4.5 kg) until your health care provider says it is safe.  · Do not drive or operate heavy machinery while taking prescription pain medicine.  · Do not use any tobacco products, such as cigarettes, chewing tobacco, and e-cigarettes. If you need help quitting, ask your health care provider.  · Keep all follow-up visits as told by your health care provider. This is important.  Contact a health care provider if:  · Your pain medicine is not helping.  · You continue to have pain after several weeks.  · You develop a fever.  · You develop a cough and you have thick or bloody mucus (sputum).  Get help right away if:  · You have difficulty breathing.  · You have chest pain.  · You have an abnormal heartbeat (palpitations).  · You feel nauseous or you have pain in your abdomen.  This information is not intended to replace advice given to you by your health care provider. Make sure you discuss any questions you have with your health care provider.  Document Released: 08/01/2005 Document Revised: 08/15/2017 Document Reviewed: 07/13/2016  [employer] Interactive Patient Education © 2017 [employer]  Incentive Spirometer  An incentive spirometer is a tool that measures how well you are filling your lungs with each breath. This tool can help keep your lungs clear and active. Taking long, deep breaths may help reverse or decrease the chance of developing breathing (pulmonary) problems, especially infection, following:  · Surgery of the chest or abdomen.  · Surgery if you have a history of smoking or a lung problem.  · A long period of time when you are unable to move or be active.  If  the spirometer includes an indicator to show your best effort, your health care provider or respiratory therapist will help you set a goal. Keep a log of your progress if directed by your health care provider.  What are the risks?  · Breathing too quickly may cause dizziness or cause you to pass out. Take your time so you do not get dizzy or lightheaded.  · If you are in pain, you may need to take or ask for pain medicine before doing incentive spirometry. It is harder to take a deep breath if you are having pain.  How to use your incentive spirometer  2. Sit on the edge of your bed if possible, or sit up as far as you can in bed or on a chair.  3. Hold the incentive spirometer in an upright position.  4. Breathe out normally.  5. Place the mouthpiece in your mouth and seal your lips tightly around it.  6. Breathe in slowly and as deeply as possible, raising the piston or the ball toward the top of the column.  7. Hold your breath for 3-5 seconds or for as long as possible. Allow the piston or ball to fall to the bottom of the column.  8. Remove the mouthpiece from your mouth and breathe out normally.  9. The spirometer may include an indicator to show your best effort. Use the indicator as a goal to work toward during each repetition.  10. Rest for a few seconds and repeat this at least 10 times, every 1-2 hours when you are awake. Take your time and take a few normal breaths between deep breaths. Breathing too quickly may cause dizziness or cause you to pass out. Take your time so you do not get dizzy or lightheaded.  11. After each set of 10 deep breaths, practice coughing to be sure your lungs are clear. If you had a surgical cut (incision) made during surgery, support your incision when coughing by placing a pillow or rolled-up towel firmly against it.  Once you are able to get out of bed, walk around indoors and cough well. You may stop using the incentive spirometer when instructed by your health care  provider.  Contact a health care provider if:  · You are having difficulty using the spirometer.  · You have trouble using the spirometer as often as instructed.  · Your pain medicine is not giving enough relief while using the spirometer.  · You have a fever.  · You develop shortness of breath.  Get help right away if:  · You develop a cough with bloody sputum.  · You develop worsening pain, redness, or discharge at or near the incision site.  This information is not intended to replace advice given to you by your health care provider. Make sure you discuss any questions you have with your health care provider.  Document Released: 04/29/2008 Document Revised: 09/11/2017 Document Reviewed: 07/27/2015  [employer] Interactive Patient Education © 2017 [employer]  Nicotine skin patches  What is this medicine?  NICOTINE (NANI oh teen) helps people stop smoking. The patches replace the nicotine found in cigarettes and help to decrease withdrawal effects. They are most effective when used in combination with a stop-smoking program.  This medicine may be used for other purposes; ask your health care provider or pharmacist if you have questions.  COMMON BRAND NAME(S): Habitrol, Nicoderm CQ, Nicotrol  What should I tell my health care provider before I take this medicine?  They need to know if you have any of these conditions:  -diabetes  -heart disease, angina, irregular heartbeat or previous heart attack  -high blood pressure  -lung disease, including asthma  -overactive thyroid  -pheochromocytoma  -seizures or a history of seizures  -skin problems, like eczema  -stomach problems or ulcers  -an unusual or allergic reaction to nicotine, adhesives, other medicines, foods, dyes, or preservatives  -pregnant or trying to get pregnant  -breast-feeding  How should I use this medicine?  This medicine is for use on the skin. Follow the directions that come with the patches. Find an area of skin on your upper arm, chest, or back that  is clean, dry, greaseless, undamaged and hairless. Wash hands with plain soap and water. Do not use anything that contains aloe, lanolin or glycerin as these may prevent the patch from sticking. Dry thoroughly. Remove the patch from the sealed pouch. Do not try to cut or trim the patch. Using your palm, press the patch firmly in place for 10 seconds to make sure that there is good contact with your skin. After applying the patch, wash your hands. Change the patch every day, keeping to a regular schedule. When you apply a new patch, use a new area of skin. Wait at least 1 week before using the same area again.  Talk to your pediatrician regarding the use of this medicine in children. Special care may be needed.  Overdosage: If you think you have taken too much of this medicine contact a poison control center or emergency room at once.  NOTE: This medicine is only for you. Do not share this medicine with others.  What if I miss a dose?  If you forget to replace a patch, use it as soon as you can. Only use one patch at a time and do not leave on the skin for longer than directed. If a patch falls off, you can replace it, but keep to your schedule and remove the patch at the right time.  What may interact with this medicine?  -medicines for asthma  -medicines for blood pressure  -medicines for mental depression  This list may not describe all possible interactions. Give your health care provider a list of all the medicines, herbs, non-prescription drugs, or dietary supplements you use. Also tell them if you smoke, drink alcohol, or use illegal drugs. Some items may interact with your medicine.  What should I watch for while using this medicine?  You should begin using the nicotine patch the day you stop smoking. It is okay if you do not succeed at your attempt to quit and have a cigarette. You can still continue your quit attempt and keep using the product as directed. Just throw away your cigarettes and get back to  your quit plan.  You can keep the patch in place during swimming, bathing, and showering. If your patch falls off during these activities, replace it.  When you first apply the patch, your skin may itch or burn. This should go away soon. When you remove a patch, the skin may look red, but this should only last for a few days. Call your doctor or health care professional if skin redness does not go away after 4 days, if your skin swells, or if you get a rash.  If you are a diabetic and you quit smoking, the effects of insulin may be increased and you may need to reduce your insulin dose. Check with your doctor or health care professional about how you should adjust your insulin dose.  If you are going to have a magnetic resonance imaging (MRI) procedure, tell your MRI technician if you have this patch on your body. It must be removed before a MRI.  What side effects may I notice from receiving this medicine?  Side effects that you should report to your doctor or health care professional as soon as possible:  -allergic reactions like skin rash, itching or hives, swelling of the face, lips, or tongue  -breathing problems  -changes in hearing  -changes in vision  -chest pain  -cold sweats  -confusion  -fast, irregular heartbeat  -feeling faint or lightheaded, falls  -headache  -increased saliva  -skin redness that lasts more than 4 days  -stomach pain  -signs and symptoms of nicotine overdose like nausea; vomiting; dizziness; weakness; and rapid heartbeat  Side effects that usually do not require medical attention (report to your doctor or health care professional if they continue or are bothersome):  -diarrhea  -dry mouth  -hiccups  -irritability  -nervousness or restlessness  -trouble sleeping or vivid dreams  This list may not describe all possible side effects. Call your doctor for medical advice about side effects. You may report side effects to FDA at 4-442-FDA-2852.  Where should I keep my medicine?  Keep out of  the reach of children.  Store at room temperature between 20 and 25 degrees C (68 and 77 degrees F). Protect from heat and light. Store in manufacturers packaging until ready to use. Throw away unused medicine after the expiration date. When you remove a patch, fold with sticky sides together; put in an empty opened pouch and throw away.  NOTE: This sheet is a summary. It may not cover all possible information. If you have questions about this medicine, talk to your doctor, pharmacist, or health care provider.  © 2018 [employer] (2015-11-16 15:46:21)  Oxygen Use at Home  Oxygen can be prescribed for home use. The prescription will show the flow rate. This is how much oxygen is to be used per minute. This will be listed in liters per minute (LPM or L/M). A liter is a metric measurement of volume.  You will use oxygen therapy as directed. It can be used while exercising, sleeping, or at rest. You may need oxygen continuously. Your health care provider may order a blood oxygen test (arterial blood gas or pulse oximetry test) that will show what your oxygen level is. Your health care provider will use these measurements to learn about your needs and follow your progress.  Home oxygen therapy is commonly used on patients with various lung (pulmonary) related conditions. Some of these conditions include:  · Asthma.  · Lung cancer.  · Pneumonia.  · Emphysema.  · Chronic bronchitis.  · Cystic fibrosis.  · Other lung diseases.  · Pulmonary fibrosis.  · Occupational lung disease.  · Heart failure.  · Chronic obstructive pulmonary disease (COPD).  3 COMMON WAYS OF PROVIDING OXYGEN THERAPY  · Gas: The gas form of oxygen is put into variously sized cylinders or tanks. The cylinders or oxygen tanks contain compressed oxygen. The cylinder is equipped with a regulator that controls the flow rate. Because the flow of oxygen out of the cylinder is constant, an oxygen conserving device may be attached to the system to  avoid waste. This device releases the gas only when you inhale and cuts it off when you exhale. Oxygen can be provided in a small cylinder that can be carried with you. Large tanks are heavy and are only for stationary use. After use, empty tanks must be exchanged for full tanks.  · Liquid: The liquid form of oxygen is put into a container similar to a thermos. When released, the liquid converts to a gas and you breathe it in just like the compressed gas. This storage method takes up less space than the compressed gas cylinder, and you can transfer the liquid to a small, portable vessel at home. Liquid oxygen is more expensive than the compressed gas, and the vessel vents when not in use. An oxygen conserving device may be built into the vessel to conserve the oxygen. Liquid oxygen is very cold, around 297° below zero.  · Oxygen concentrator: This medical device filters oxygen from room air and gives almost 100% oxygen to the patient. Oxygen concentrators are powered by electricity. Benefits of this system are:  ¨ It does not need to be resupplied.  ¨ It is not as costly as liquid oxygen.  ¨ Extra tubing permits the user to move around easier.  There are several types of small, portable oxygen systems available which can help you remain active and mobile. You must have a cylinder of oxygen as a backup in the event of a power failure. Advise your electric power company that you are on oxygen therapy in order to get priority service when there is a power failure.  OXYGEN DELIVERY DEVICES  There are 3 common ways to deliver oxygen to your body.  · Nasal cannula. This is a 2-pronged device inserted in the nostrils that is connected to tubing carrying the oxygen. The tubing can rest on the ears or be attached to the frame of eyeglasses.  · Mask. People who need a high flow of oxygen generally use a mask.  · Transtracheal catheter. Transtracheal oxygen therapy requires the insertion of a small, flexible tube (catheter) in  "the windpipe (trachea). This catheter is held in place by a necklace. Since transtracheal oxygen bypasses the mouth, nose, and throat, a humidifier is absolutely required at flow rates of 1 LPM or greater.  OXYGEN USE SAFETY TIPS  · Never smoke while using oxygen. Oxygen does not burn or explode, but flammable materials will burn faster in the presence of oxygen.  · Keep a fire extinguisher close by. Let your fire department know that you have oxygen in your home.  · Warn visitors not to smoke near you when you are using oxygen. Put up \"no smoking\" signs in your home where you most often use the oxygen.  · When you go to a restaurant with your portable oxygen source, ask to be seated in the nonsmoking section.  · Stay at least 5 feet away from gas stoves, candles, lighted fireplaces, or other heat sources.  · Do not use materials that burn easily (flammable) while using your oxygen.  · If you use an oxygen cylinder, make sure it is secured to some fixed object or in a stand. If you use liquid oxygen, make sure the vessel is kept upright to keep the oxygen from pouring out. Liquid oxygen is so cold it can hurt your skin.  · If you use an oxygen concentrator, call your electric company so you will be given priority service if your power goes out. Avoid using extension cords, if possible.  · Regularly test your smoke detectors at home to make sure they work. If you receive care in your home from a nurse or other health care provider, he or she may also check to make sure your smoke detectors work.  GUIDELINES FOR CLEANING YOUR EQUIPMENT  · Wash the nasal prongs with a liquid soap. Thoroughly rinse them once or twice a week.  · Replace the prongs every 2 to 4 weeks. If you have an infection (cold, pneumonia) change them when you are well.  · Your health care provider will give you instructions on how to clean your transtracheal catheter.  · The humidifier bottle should be washed with soap and warm water and rinsed " thoroughly between each refill. Air-dry the bottle before filling it with sterile or distilled water. The bottle and its top should be disinfected after they are cleaned.  · If you use an oxygen concentrator, unplug the unit. Then wipe down the cabinet with a damp cloth and dry it daily. The air filter should be cleaned at least twice a week.  · Follow your home medical equipment and service company's directions for cleaning the compressor filter.  HOME CARE INSTRUCTIONS   · Do not change the flow of oxygen unless directed by your health care provider.  · Do not use alcohol or other sedating drugs unless instructed. They slow your breathing rate.  · Do not use materials that burn easily (flammable) while using your oxygen.  · Always keep a spare tank of oxygen. Plan ahead for holidays when you may not be able to get a prescription filled.  · Use water-based lubricants on your lips or nostrils. Do not use an oil-based product like petroleum jelly.  · To prevent your cheeks or the skin behind your ears from becoming irritated, tuck some gauze under the tubing.  · If you have persistent redness under your nose, call your health care provider.  · When you no longer need oxygen, your doctor will have the oxygen discontinued. Oxygen is not addicting or habit forming.  · Use the oxygen as instructed. Too much oxygen can be harmful and too little will not give you the benefit you need.  · Shortness of breath is not always from a lack of oxygen. If your oxygen level is not the cause of your shortness of breath, taking oxygen will not help.  SEEK MEDICAL CARE IF:   · You have frequent headaches.  · You have shortness of breath or a lasting cough.  · You have anxiety.  · You are confused.  · You are drowsy or sleepy all the time.  · You develop an illness which aggravates your breathing.  · You cannot exercise.  · You are restless.  · You have blue lips or fingernails.  · You have difficult or irregular breathing and it is  getting worse.  · You have a fever.     This information is not intended to replace advice given to you by your health care provider. Make sure you discuss any questions you have with your health care provider.     Document Released: 03/09/2005 Document Revised: 01/08/2016 Document Reviewed: 07/30/2014  [employer] Interactive Patient Education ©2016 [employer]

## 2018-10-11 NOTE — DISCHARGE PLANNING
Agency/Facility Name: [facility]  Spoke To:[facility] Message  Outcome: Pending, no qualifying diagnosis.

## 2018-10-11 NOTE — DISCHARGE PLANNING
Medical Social Work     Referral: O2    MARIA ANTONIA called [facility] and spoke with Anali about the O2 for the pt. Anali advised MARIA ANTONIA that the O2 is going to be delivered to the pt within the next 30 minutes. Anali requested MARIA ANTONIA fax the new order that states the pts Diagnosis is COPD. MARIA ANTONIA faxed [facility] the new referral with the order that states the pt has COPD.     The new order was put in on 10/11/2018 at 1623.     MARIA ANTONIA called the pt RN, Wendy and advised her that the pt O2 would be delivered soon.     Plan: MARIA ANTONIA will remain available for pt and family support.

## 2018-10-11 NOTE — PROGRESS NOTES
Report received from night RN, assumed Care.   Patient is AOx4, responds appropriately.      Pain controlled at this time with oral pain medications scheduled and prn.  Patient is tolerating regular diet, denies nausea/vomiting. + flatus; would like suppository for BM- patient continues to tell RN to wait to administer.  Up stanbdy with steady gait when using FWW.  IS max pull 900-1000- productive splinted cough.  1L silicone NC, patient down to low 80's with ambulation and sitting with no oxygen, will fill out home O2 flow sheet to assess for need.  patient up on walk this am, up to chair for breakfast.  Patient anxious to go home today.    Plan of care discussed, all questions answered.    Explained importance of calling before getting OOB and pt verbalizes understanding.       Call light and belongings within reach, treaded slipper socks on, SCD in use, bed in lowest locked position.  Hourly rounding in place, all needs met at this time

## 2018-10-11 NOTE — CARE PLAN
Problem: Safety  Goal: Will remain free from injury  Outcome: PROGRESSING AS EXPECTED  Proper fall precautions in place. Call light within reach and encouraged to use. Hourly rounding in practice.     Problem: Pain Management  Goal: Pain level will decrease to patient's comfort goal  Outcome: PROGRESSING AS EXPECTED  Pt receiving Oxy 10mg tolerating well. Hourly rounding in place.

## 2018-10-11 NOTE — FACE TO FACE
Face to Face Note  -  Durable Medical Equipment    MAURISIO Candelaria - NPI: [id number]  I certify that this patient is under my care and that they had a durable medical equipment(DME)face to face encounter by myself that meets the physician DME face-to-face encounter requirements with this patient on:    Date of encounter:   Patient:                    MRN:                       YOB: 2017  Jose Chappell  [id number]  1945     The encounter with the patient was in whole, or in part, for the following medical condition, which is the primary reason for durable medical equipment:  COPD    I certify that, based on my findings, the following durable medical equipment is medically necessary:  Oxygen.    HOME O2 Saturation Measurements:(Values must be present for Home Oxygen orders)  Room air sat at rest: 84  Room air sat with amb: 83  With liters of O2: 1, O2 sat at rest with O2: 94  With Liters of O2: 1, O2 sat with amb with O2 : 89  Is the patient mobile?: Yes    My Clinical findings support the need for the above equipment due to:  Hypoxia, Wheezing (Chronic)
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
## 2018-10-11 NOTE — DISCHARGE PLANNING
Spoke To: JOHNNIE on VM for [facility] HELDER Pina  Outcome: [facility] is anticipating an updated DME Order to include DX of the COPD, which is currently in the F2F but not on the order. HELDER Ríos notified.

## 2018-10-11 NOTE — DISCHARGE PLANNING
Anticipated Discharge Disposition: Home with O2 and walker    Action: RN CM provided pt with DME choice form, then faxed to [facility].  Spoke with bedside RN, she will order the walker for the pt.    Barriers to Discharge: none    Plan: Home when medically cleared.
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
## 2018-10-12 RX ORDER — AMLODIPINE BESYLATE 10 MG/1
10 TABLET ORAL DAILY
Qty: 30 TAB | Refills: 1 | Status: SHIPPED | OUTPATIENT
Start: 2018-10-12 | End: 2019-04-05 | Stop reason: SDUPTHER

## 2018-10-12 NOTE — PROGRESS NOTES
Patients son to bedside to  mother.   Signed prescriptions given to son to , son to [facility] with prescriptions before discharge to home as unable to  at home town this late.   Discharge paperwork signed.  All belongings packed.   Eating dinner.   Awaiting son back for discharge.
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
## 2018-12-07 ENCOUNTER — OFFICE VISIT (OUTPATIENT)
Dept: CARDIOLOGY | Facility: CLINIC | Age: 73
End: 2018-12-07
Payer: MEDICARE

## 2018-12-07 ENCOUNTER — NON-PROVIDER VISIT (OUTPATIENT)
Dept: CARDIOLOGY | Facility: CLINIC | Age: 73
End: 2018-12-07
Payer: MEDICARE

## 2018-12-07 VITALS
HEART RATE: 72 BPM | DIASTOLIC BLOOD PRESSURE: 90 MMHG | HEIGHT: 66 IN | OXYGEN SATURATION: 93 % | BODY MASS INDEX: 21.69 KG/M2 | WEIGHT: 135 LBS | SYSTOLIC BLOOD PRESSURE: 160 MMHG

## 2018-12-07 DIAGNOSIS — I10 ESSENTIAL HYPERTENSION: ICD-10-CM

## 2018-12-07 DIAGNOSIS — Z79.899 ENCOUNTER FOR LONG-TERM (CURRENT) USE OF HIGH-RISK MEDICATION: ICD-10-CM

## 2018-12-07 DIAGNOSIS — E78.49 OTHER HYPERLIPIDEMIA: ICD-10-CM

## 2018-12-07 DIAGNOSIS — I49.3 PVC (PREMATURE VENTRICULAR CONTRACTION): ICD-10-CM

## 2018-12-07 DIAGNOSIS — R00.2 PALPITATIONS: ICD-10-CM

## 2018-12-07 PROCEDURE — 99214 OFFICE O/P EST MOD 30 MIN: CPT | Performed by: INTERNAL MEDICINE

## 2018-12-07 PROCEDURE — 93224 XTRNL ECG REC UP TO 48 HRS: CPT | Performed by: INTERNAL MEDICINE

## 2018-12-07 RX ORDER — CARVEDILOL 3.12 MG/1
3.12 TABLET ORAL 2 TIMES DAILY WITH MEALS
Qty: 60 TAB | Refills: 11 | Status: SHIPPED | OUTPATIENT
Start: 2018-12-07 | End: 2019-01-03 | Stop reason: SDUPTHER

## 2018-12-07 ASSESSMENT — ENCOUNTER SYMPTOMS
PALPITATIONS: 1
LOSS OF CONSCIOUSNESS: 0
DEPRESSION: 0
DIZZINESS: 0
ABDOMINAL PAIN: 0
ORTHOPNEA: 0
FALLS: 0
PND: 0
SHORTNESS OF BREATH: 0

## 2018-12-07 NOTE — LETTER
[facility] [address],   [address]  [place] [address]  Phone: [phone]  Fax: [phone]              Jose Chappell  1945    Encounter Date: 12/7/2018    Deanna Ribera M.D.          PROGRESS NOTE:  Chief Complaint   Patient presents with   • HTN (Controlled)       Subjective:   Jose Chappell is a 73 y.o. female who presents today to follow-up on hypertension.    Her blood pressure usually is 140 systolic or higher.  She has been compliant with her medications.  Likes to add salt to her meals.  She is currently on amlodipine 20 mg once daily.    For hyperlipidemia she is currently on Crestor which she is tolerating well.    Patient's main concern today is palpitations.  She reports having palpitations almost daily, usually when she first lies down.  Symptoms last for a few seconds and then spontaneously resolve.  No associated dizziness.  No symptoms while she is active.  She reports that she has had these symptoms for years, but they appear to be increasing in frequency now.    Past Medical History:   Diagnosis Date   • Hyperlipidemia    • Hypertension    • Rheumatoid arthritis, adult (HCC)      Past Surgical History:   Procedure Laterality Date   • HYSTERECTOMY, VAGINAL     • TONSILLECTOMY       Family History   Problem Relation Age of Onset   • Other Mother         Breast cancer   • Lung Disease Father         COPD   • Heart Failure Sister    • Heart Attack Sister         MI at a59     Social History     Social History   • Marital status:      Spouse name: N/A   • Number of children: N/A   • Years of education: N/A     Occupational History   • Not on file.     Social History Main Topics   • Smoking status: Current Every Day Smoker     Packs/day: 1.00     Years: 40.00     Types: Cigarettes   • Smokeless tobacco: Never Used   • Alcohol use No   • Drug use: No   • Sexual activity: Yes     Partners: Male    "Comment:      Other Topics Concern   • Not on file     Social History Narrative   • No narrative on file     No Known Allergies  Outpatient Encounter Prescriptions as of 12/7/2018   Medication Sig Dispense Refill   • carvedilol (COREG) 3.125 MG Tab Take 1 Tab by mouth 2 times a day, with meals. 60 Tab 11   • amLODIPine (NORVASC) 10 MG Tab Take 1 Tab by mouth every day. (Patient taking differently: Take 20 mg by mouth every day.) 30 Tab 1   • rosuvastatin (CRESTOR) 20 MG Tab Take 1 Tab by mouth every evening. 90 Tab 3   • montelukast (SINGULAIR) 10 MG Tab Take 10 mg by mouth every bedtime.     • albuterol 108 (90 BASE) MCG/ACT Aero Soln inhalation aerosol Inhale 2 Puffs by mouth every 6 hours as needed for Shortness of Breath.     • escitalopram (LEXAPRO) 10 MG Tab Take 10 mg by mouth every day.     • infliximab (REMICADE) 100 MG Recon Soln 100 mg by Intravenous route every 14 days.     • tiotropium (SPIRIVA) 18 MCG Cap Inhale 18 mcg by mouth every day.     • [DISCONTINUED] nicotine (NICODERM) 21 MG/24HR PATCH 24 HR Apply 1 Patch to skin as directed every 24 hours. 30 Patch 0     No facility-administered encounter medications on file as of 12/7/2018.      Review of Systems   Constitutional: Negative for malaise/fatigue.   Respiratory: Negative for shortness of breath.    Cardiovascular: Positive for palpitations. Negative for chest pain, orthopnea, leg swelling and PND.   Gastrointestinal: Negative for abdominal pain.   Musculoskeletal: Negative for falls.   Neurological: Negative for dizziness and loss of consciousness.   Psychiatric/Behavioral: Negative for depression.   All other systems reviewed and are negative.       Objective:   /90 (BP Location: Right arm, Patient Position: Sitting)   Pulse 72   Ht 1.676 m (5' 6\")   Wt 61.2 kg (135 lb)   SpO2 93%   BMI 21.79 kg/m²      Physical Exam   Constitutional: She is oriented to person, place, and time. She appears well-developed and well-nourished. " No distress.   HENT:   Head: Normocephalic and atraumatic.   Eyes: Conjunctivae are normal. No scleral icterus.   Neck: Normal range of motion. Neck supple.   Cardiovascular: Normal rate, regular rhythm and normal heart sounds.  Exam reveals no gallop and no friction rub.    No murmur heard.  Pulmonary/Chest: Effort normal and breath sounds normal. No respiratory distress. She has no wheezes. She has no rales.   Abdominal: Soft. She exhibits no distension. There is no tenderness.   Musculoskeletal: She exhibits no edema.   Neurological: She is alert and oriented to person, place, and time.   Skin: Skin is warm and dry. She is not diaphoretic.   Psychiatric: She has a normal mood and affect. Her behavior is normal.   Nursing note and vitals reviewed.    Labs performed in October 2018 were reviewed and showed normal creatinine.    Assessment:     1. Essential hypertension     2. Palpitations  Holter Monitor Study   3. Other hyperlipidemia     4. Encounter for long-term (current) use of high-risk medication         Medical Decision Making:  Today's Assessment / Status / Plan:     Palpitations: new issue. Patient has almost daily symptoms.  She will be referred for a 48-hour Holter monitor to evaluate for any arrhythmias.    Hyperlipidemia: Continue Crestor at current dose.    Hypertension: Blood pressures not at goal.  I have advised the patient to decrease her amlodipine to 10 mg/day as prescribed.  I will start her on carvedilol 3.125 mg to be taken twice daily.    Return to clinic in 3 months or earlier if needed.    Thank you for allowing me to participate in the care of this patient. Please do not hesitate to contact me with any questions.    Deanna Ribera MD  Cardiologist  [facility]      PLEASE NOTE: This dictation was created using voice recognition software.         Lourdes Christensen, P.ANTHONY.  [address] [place] [address]  VIA Facsimile: [phone]

## 2018-12-07 NOTE — PROGRESS NOTES
Chief Complaint   Patient presents with   • HTN (Controlled)       Subjective:   Jose Chappell is a 73 y.o. female who presents today to follow-up on hypertension.    Her blood pressure usually is 140 systolic or higher.  She has been compliant with her medications.  Likes to add salt to her meals.  She is currently on amlodipine 20 mg once daily.    For hyperlipidemia she is currently on Crestor which she is tolerating well.    Patient's main concern today is palpitations.  She reports having palpitations almost daily, usually when she first lies down.  Symptoms last for a few seconds and then spontaneously resolve.  No associated dizziness.  No symptoms while she is active.  She reports that she has had these symptoms for years, but they appear to be increasing in frequency now.    Past Medical History:   Diagnosis Date   • Hyperlipidemia    • Hypertension    • Rheumatoid arthritis, adult (HCC)      Past Surgical History:   Procedure Laterality Date   • HYSTERECTOMY, VAGINAL     • TONSILLECTOMY       Family History   Problem Relation Age of Onset   • Other Mother         Breast cancer   • Lung Disease Father         COPD   • Heart Failure Sister    • Heart Attack Sister         MI at a59     Social History     Social History   • Marital status:      Spouse name: N/A   • Number of children: N/A   • Years of education: N/A     Occupational History   • Not on file.     Social History Main Topics   • Smoking status: Current Every Day Smoker     Packs/day: 1.00     Years: 40.00     Types: Cigarettes   • Smokeless tobacco: Never Used   • Alcohol use No   • Drug use: No   • Sexual activity: Yes     Partners: Male      Comment:      Other Topics Concern   • Not on file     Social History Narrative   • No narrative on file     No Known Allergies  Outpatient Encounter Prescriptions as of 12/7/2018   Medication Sig Dispense Refill   • carvedilol (COREG) 3.125 MG Tab Take 1 Tab by mouth 2 times a day,  "with meals. 60 Tab 11   • amLODIPine (NORVASC) 10 MG Tab Take 1 Tab by mouth every day. (Patient taking differently: Take 20 mg by mouth every day.) 30 Tab 1   • rosuvastatin (CRESTOR) 20 MG Tab Take 1 Tab by mouth every evening. 90 Tab 3   • montelukast (SINGULAIR) 10 MG Tab Take 10 mg by mouth every bedtime.     • albuterol 108 (90 BASE) MCG/ACT Aero Soln inhalation aerosol Inhale 2 Puffs by mouth every 6 hours as needed for Shortness of Breath.     • escitalopram (LEXAPRO) 10 MG Tab Take 10 mg by mouth every day.     • infliximab (REMICADE) 100 MG Recon Soln 100 mg by Intravenous route every 14 days.     • tiotropium (SPIRIVA) 18 MCG Cap Inhale 18 mcg by mouth every day.     • [DISCONTINUED] nicotine (NICODERM) 21 MG/24HR PATCH 24 HR Apply 1 Patch to skin as directed every 24 hours. 30 Patch 0     No facility-administered encounter medications on file as of 12/7/2018.      Review of Systems   Constitutional: Negative for malaise/fatigue.   Respiratory: Negative for shortness of breath.    Cardiovascular: Positive for palpitations. Negative for chest pain, orthopnea, leg swelling and PND.   Gastrointestinal: Negative for abdominal pain.   Musculoskeletal: Negative for falls.   Neurological: Negative for dizziness and loss of consciousness.   Psychiatric/Behavioral: Negative for depression.   All other systems reviewed and are negative.       Objective:   /90 (BP Location: Right arm, Patient Position: Sitting)   Pulse 72   Ht 1.676 m (5' 6\")   Wt 61.2 kg (135 lb)   SpO2 93%   BMI 21.79 kg/m²     Physical Exam   Constitutional: She is oriented to person, place, and time. She appears well-developed and well-nourished. No distress.   HENT:   Head: Normocephalic and atraumatic.   Eyes: Conjunctivae are normal. No scleral icterus.   Neck: Normal range of motion. Neck supple.   Cardiovascular: Normal rate, regular rhythm and normal heart sounds.  Exam reveals no gallop and no friction rub.    No murmur " heard.  Pulmonary/Chest: Effort normal and breath sounds normal. No respiratory distress. She has no wheezes. She has no rales.   Abdominal: Soft. She exhibits no distension. There is no tenderness.   Musculoskeletal: She exhibits no edema.   Neurological: She is alert and oriented to person, place, and time.   Skin: Skin is warm and dry. She is not diaphoretic.   Psychiatric: She has a normal mood and affect. Her behavior is normal.   Nursing note and vitals reviewed.    Labs performed in October 2018 were reviewed and showed normal creatinine.    Assessment:     1. Essential hypertension     2. Palpitations  Holter Monitor Study   3. Other hyperlipidemia     4. Encounter for long-term (current) use of high-risk medication         Medical Decision Making:  Today's Assessment / Status / Plan:     Palpitations: new issue. Patient has almost daily symptoms.  She will be referred for a 48-hour Holter monitor to evaluate for any arrhythmias.    Hyperlipidemia: Continue Crestor at current dose.    Hypertension: Blood pressures not at goal.  I have advised the patient to decrease her amlodipine to 10 mg/day as prescribed.  I will start her on carvedilol 3.125 mg to be taken twice daily.    Return to clinic in 3 months or earlier if needed.    Thank you for allowing me to participate in the care of this patient. Please do not hesitate to contact me with any questions.    Deanna Ribera MD  Cardiologist  [facility]      PLEASE NOTE: This dictation was created using voice recognition software.

## 2018-12-07 NOTE — PATIENT INSTRUCTIONS
Please take carvedilol 3.125mg twice daily.   Decrease amlodipine to 10mg once daily (as prescribed)

## 2018-12-14 LAB — EKG IMPRESSION: NORMAL

## 2019-01-03 ENCOUNTER — TELEPHONE (OUTPATIENT)
Dept: CARDIOLOGY | Facility: MEDICAL CENTER | Age: 74
End: 2019-01-03

## 2019-01-03 DIAGNOSIS — I47.10 PSVT (PAROXYSMAL SUPRAVENTRICULAR TACHYCARDIA): ICD-10-CM

## 2019-01-03 DIAGNOSIS — I10 HYPERTENSION, UNSPECIFIED TYPE: ICD-10-CM

## 2019-01-03 RX ORDER — CARVEDILOL 6.25 MG/1
3.12 TABLET ORAL 2 TIMES DAILY WITH MEALS
Qty: 180 TAB | Refills: 3 | Status: SHIPPED | OUTPATIENT
Start: 2019-01-03 | End: 2019-02-11 | Stop reason: SDUPTHER

## 2019-01-03 NOTE — TELEPHONE ENCOUNTER
----- Message from Deanna Ribera M.D. sent at 1/2/2019 12:12 PM PST -----  Holter reviewed.  Frequent ectopy noted.  Please increase carvedilol to 6.25 mg twice daily.  Follow-up as previously discussed.  Thank you  AA

## 2019-01-03 NOTE — TELEPHONE ENCOUNTER
Karen Montoya R.N.   Phone Number: [phone]             Osiel     Pt returning your call, please try her later this morning, [phone]      =================  Called and pt was unavailable. Pt to return call.

## 2019-01-04 NOTE — TELEPHONE ENCOUNTER
Pt returned call. Explained recommendations and educated pt on meaning of results. Pt states understanding. New Rx sent to [facility]

## 2019-02-11 ENCOUNTER — TELEPHONE (OUTPATIENT)
Dept: CARDIOLOGY | Facility: MEDICAL CENTER | Age: 74
End: 2019-02-11

## 2019-02-11 DIAGNOSIS — I47.10 PSVT (PAROXYSMAL SUPRAVENTRICULAR TACHYCARDIA): ICD-10-CM

## 2019-02-11 DIAGNOSIS — I10 HYPERTENSION, UNSPECIFIED TYPE: ICD-10-CM

## 2019-02-11 RX ORDER — HYDROCHLOROTHIAZIDE 12.5 MG/1
12.5 TABLET ORAL DAILY
Qty: 30 TAB | Refills: 11 | Status: SHIPPED | OUTPATIENT
Start: 2019-02-11 | End: 2019-02-11 | Stop reason: SDUPTHER

## 2019-02-11 RX ORDER — CARVEDILOL 6.25 MG/1
6.25 TABLET ORAL 2 TIMES DAILY WITH MEALS
Qty: 180 TAB | Refills: 3 | Status: SHIPPED | OUTPATIENT
Start: 2019-02-11 | End: 2020-01-09

## 2019-02-11 NOTE — TELEPHONE ENCOUNTER
----- Message from Genoveva Goodson sent at 2/11/2019 11:03 AM PST -----  Regarding: blood pressure is running high   AA/Osiel      Patient said she is on 2 blood pressure medications and her blood pressure is still running high. Please call to advise. Pt. #[phone].

## 2019-02-11 NOTE — TELEPHONE ENCOUNTER
Called and spoke with pt. She states that her SBP has been 160's-180's after taking her medications and 180's-190's prior to taking her medications. She has been asymptomatic with this and taking medications as directed. She is leaving town tomorrow for [place].

## 2019-02-12 NOTE — TELEPHONE ENCOUNTER
Deanna Ribera M.D.   You 7 minutes ago (4:17 PM)      What is her HR?   If HR >60, please increase coreg to 6.25mg twice daily.   Start HCTZ 12.5mg once daily.   Chem-7 in 7-10 days.  (Routing comment)      ===========================  Informed Dr. Huang via [facility] Text that the pt is currently taking 6.25 of coreg. Per written order from Dr. Ribera, will only start pt on HCTZ for now. Called and left detailed voicemail for pt with instructions to call back with questions. Rx sent to [facility]. Lab orders placed and slip mailed to pt.

## 2019-02-13 RX ORDER — HYDROCHLOROTHIAZIDE 12.5 MG/1
TABLET ORAL
Qty: 90 TAB | Refills: 3 | Status: SHIPPED | OUTPATIENT
Start: 2019-02-13 | End: 2019-05-21

## 2019-04-05 ENCOUNTER — OFFICE VISIT (OUTPATIENT)
Dept: CARDIOLOGY | Facility: CLINIC | Age: 74
End: 2019-04-05
Payer: MEDICARE

## 2019-04-05 VITALS
OXYGEN SATURATION: 90 % | DIASTOLIC BLOOD PRESSURE: 60 MMHG | HEIGHT: 65 IN | BODY MASS INDEX: 22.49 KG/M2 | SYSTOLIC BLOOD PRESSURE: 122 MMHG | WEIGHT: 135 LBS | HEART RATE: 72 BPM

## 2019-04-05 DIAGNOSIS — I10 ESSENTIAL HYPERTENSION, BENIGN: ICD-10-CM

## 2019-04-05 DIAGNOSIS — R00.2 PALPITATIONS: ICD-10-CM

## 2019-04-05 DIAGNOSIS — Z79.899 ENCOUNTER FOR LONG-TERM (CURRENT) USE OF HIGH-RISK MEDICATION: ICD-10-CM

## 2019-04-05 DIAGNOSIS — E78.49 OTHER HYPERLIPIDEMIA: ICD-10-CM

## 2019-04-05 DIAGNOSIS — R09.89 BRUIT OF LEFT CAROTID ARTERY: ICD-10-CM

## 2019-04-05 DIAGNOSIS — F17.210 CIGARETTE SMOKER: ICD-10-CM

## 2019-04-05 PROCEDURE — 99406 BEHAV CHNG SMOKING 3-10 MIN: CPT | Performed by: INTERNAL MEDICINE

## 2019-04-05 PROCEDURE — 99215 OFFICE O/P EST HI 40 MIN: CPT | Mod: 25 | Performed by: INTERNAL MEDICINE

## 2019-04-05 RX ORDER — ASPIRIN 81 MG/1
81 TABLET, CHEWABLE ORAL DAILY
COMMUNITY

## 2019-04-05 RX ORDER — AMLODIPINE BESYLATE 5 MG/1
5 TABLET ORAL 2 TIMES DAILY
Qty: 180 TAB | Refills: 3 | Status: SHIPPED | OUTPATIENT
Start: 2019-04-05 | End: 2020-04-08 | Stop reason: SDUPTHER

## 2019-04-05 ASSESSMENT — ENCOUNTER SYMPTOMS
LOSS OF CONSCIOUSNESS: 0
PND: 0
PALPITATIONS: 1
FALLS: 0
ABDOMINAL PAIN: 0
ORTHOPNEA: 0
DEPRESSION: 0
DIZZINESS: 0
SHORTNESS OF BREATH: 0

## 2019-04-05 NOTE — LETTER
[facility] [address],   [address]  [place] [address]  Phone: [phone]  Fax: [phone]              Jose Chappell  1945    Encounter Date: 4/5/2019    Deanna Ribera M.D.          PROGRESS NOTE:  Chief Complaint   Patient presents with   • HTN (Controlled)       Subjective:   Jose Chappell is a 73-year-old female presenting for follow-up on her hypertension.    Since her last visit her blood pressures are now much better controlled, like today.  She is been compliant with her medications and only takes amlodipine as prescribed.  She does spelled in half and takes 5 mg twice daily.    Her palpitations have significantly improved since increasing her carvedilol dosing.    For hyperlipidemia she continues to be on Crestor which she is tolerating well.    Her main concern today is carotid stenosis as her family member just recently had a surgery for this.    She continues to smoke about half a pack a day.    Past Medical History:   Diagnosis Date   • Hyperlipidemia    • Hypertension    • Rheumatoid arthritis, adult (HCC)      Past Surgical History:   Procedure Laterality Date   • HYSTERECTOMY, VAGINAL     • TONSILLECTOMY       Family History   Problem Relation Age of Onset   • Other Mother         Breast cancer   • Lung Disease Father         COPD   • Heart Failure Sister    • Heart Attack Sister         MI at a59     Social History     Social History   • Marital status:      Spouse name: N/A   • Number of children: N/A   • Years of education: N/A     Occupational History   • Not on file.     Social History Main Topics   • Smoking status: Current Every Day Smoker     Packs/day: 1.00     Years: 40.00     Types: Cigarettes   • Smokeless tobacco: Never Used   • Alcohol use No   • Drug use: No   • Sexual activity: Yes     Partners: Male      Comment:      Other Topics Concern   • Not on file     Social History  "Narrative   • No narrative on file     No Known Allergies  Outpatient Encounter Prescriptions as of 4/5/2019   Medication Sig Dispense Refill   • amLODIPine (NORVASC) 5 MG Tab Take 1 Tab by mouth 2 Times a Day. 180 Tab 3   • aspirin (ASA) 81 MG Chew Tab chewable tablet Take 81 mg by mouth every day.     • carvedilol (COREG) 6.25 MG Tab Take 1 Tab by mouth 2 times a day, with meals. 180 Tab 3   • rosuvastatin (CRESTOR) 20 MG Tab Take 1 Tab by mouth every evening. 90 Tab 3   • montelukast (SINGULAIR) 10 MG Tab Take 10 mg by mouth every bedtime.     • albuterol 108 (90 BASE) MCG/ACT Aero Soln inhalation aerosol Inhale 2 Puffs by mouth every 6 hours as needed for Shortness of Breath.     • escitalopram (LEXAPRO) 10 MG Tab Take 10 mg by mouth every day.     • infliximab (REMICADE) 100 MG Recon Soln 300 mg by Intravenous route Q 8 WEEKS.     • hydroCHLOROthiazide (HYDRODIURIL) 12.5 MG tablet TAKE 1 TABLET BY MOUTH EVERY DAY (Patient not taking: Reported on 4/5/2019) 90 Tab 3   • [DISCONTINUED] amLODIPine (NORVASC) 10 MG Tab Take 1 Tab by mouth every day. (Patient taking differently: Take 20 mg by mouth every day.) 30 Tab 1   • tiotropium (SPIRIVA) 18 MCG Cap Inhale 18 mcg by mouth every day.       No facility-administered encounter medications on file as of 4/5/2019.      Review of Systems   Constitutional: Negative for malaise/fatigue.   Respiratory: Negative for shortness of breath.    Cardiovascular: Positive for palpitations. Negative for chest pain, orthopnea, leg swelling and PND.   Gastrointestinal: Negative for abdominal pain.   Musculoskeletal: Negative for falls.   Neurological: Negative for dizziness and loss of consciousness.   Psychiatric/Behavioral: Negative for depression.   All other systems reviewed and are negative.       Objective:   /60 (BP Location: Right arm, Patient Position: Sitting)   Pulse 72   Ht 1.651 m (5' 5\")   Wt 61.2 kg (135 lb)   SpO2 90%   BMI 22.47 kg/m²      Physical Exam   "   Constitutional: She is oriented to person, place, and time. She appears well-developed and well-nourished. No distress.   HENT:   Head: Normocephalic and atraumatic.   Eyes: Conjunctivae are normal. No scleral icterus.   Neck: Normal range of motion. Neck supple.   Cardiovascular: Normal rate, regular rhythm and normal heart sounds.  Exam reveals no gallop and no friction rub.    No murmur heard.  Pulses:       Carotid pulses are on the left side with bruit.  Pulmonary/Chest: Effort normal and breath sounds normal. No respiratory distress. She has no wheezes. She has no rales.   Abdominal: Soft. She exhibits no distension. There is no tenderness.   Musculoskeletal: She exhibits no edema.   Neurological: She is alert and oriented to person, place, and time.   Skin: Skin is warm and dry. She is not diaphoretic.   Psychiatric: She has a normal mood and affect. Her behavior is normal.   Nursing note and vitals reviewed.    Holter monitor performed December 2018 was personally reviewed and per my interpretation showed normal sinus rhythm.  Frequent PVCs approximately 5% of total beats.  No sustained arrhythmias.    Labs performed in April 2019 were reviewed and showed normal LDL.  Normal creatinine.    Assessment:     1. Bruit of left carotid artery  US-CAROTID DOPPLER BILAT   2. Essential hypertension, benign     3. Other hyperlipidemia     4. Cigarette smoker     5. Palpitations     6. Encounter for long-term (current) use of high-risk medication         Medical Decision Making:  Today's Assessment / Status / Plan:     Palpitations:  Frequent PVCs:  Hypertension:  Her symptoms have improved.  Blood pressure is at goal.  Continue carvedilol along with other antihypertensives at current dose.    Hyperlipidemia: LDL at goal.  Continue Crestor at current dose.    Left carotid bruit: Patient is a smoker.  Carotid duplex ordered today.  She is currently on aspirin.    Tobacco use: Patient continues to smoke on a daily  basis.  I have discussed the benefits of smoking cessation.  She is thinking of quitting and will let us know when she is ready.  I spent 5 minutes discussing smoking cessation.    Return to clinic in 6 months to 1 year or earlier if needed.    Thank you for allowing me to participate in the care of this patient. Please do not hesitate to contact me with any questions.    Deanna Ribera MD  Cardiologist  [facility]      PLEASE NOTE: This dictation was created using voice recognition software.         Lourdes Christensen, P.A.  [address] [place] [address]  VIA Facsimile: [phone]

## 2019-04-05 NOTE — PROGRESS NOTES
Chief Complaint   Patient presents with   • HTN (Controlled)       Subjective:   Jose Chappell is a 73-year-old female presenting for follow-up on her hypertension.    Since her last visit her blood pressures are now much better controlled, like today.  She is been compliant with her medications and only takes amlodipine as prescribed.  She does spelled in half and takes 5 mg twice daily.    Her palpitations have significantly improved since increasing her carvedilol dosing.    For hyperlipidemia she continues to be on Crestor which she is tolerating well.    Her main concern today is carotid stenosis as her family member just recently had a surgery for this.    She continues to smoke about half a pack a day.    Past Medical History:   Diagnosis Date   • Hyperlipidemia    • Hypertension    • Rheumatoid arthritis, adult (HCC)      Past Surgical History:   Procedure Laterality Date   • HYSTERECTOMY, VAGINAL     • TONSILLECTOMY       Family History   Problem Relation Age of Onset   • Other Mother         Breast cancer   • Lung Disease Father         COPD   • Heart Failure Sister    • Heart Attack Sister         MI at a59     Social History     Social History   • Marital status:      Spouse name: N/A   • Number of children: N/A   • Years of education: N/A     Occupational History   • Not on file.     Social History Main Topics   • Smoking status: Current Every Day Smoker     Packs/day: 1.00     Years: 40.00     Types: Cigarettes   • Smokeless tobacco: Never Used   • Alcohol use No   • Drug use: No   • Sexual activity: Yes     Partners: Male      Comment:      Other Topics Concern   • Not on file     Social History Narrative   • No narrative on file     No Known Allergies  Outpatient Encounter Prescriptions as of 4/5/2019   Medication Sig Dispense Refill   • amLODIPine (NORVASC) 5 MG Tab Take 1 Tab by mouth 2 Times a Day. 180 Tab 3   • aspirin (ASA) 81 MG Chew Tab chewable tablet Take 81 mg by mouth  "every day.     • carvedilol (COREG) 6.25 MG Tab Take 1 Tab by mouth 2 times a day, with meals. 180 Tab 3   • rosuvastatin (CRESTOR) 20 MG Tab Take 1 Tab by mouth every evening. 90 Tab 3   • montelukast (SINGULAIR) 10 MG Tab Take 10 mg by mouth every bedtime.     • albuterol 108 (90 BASE) MCG/ACT Aero Soln inhalation aerosol Inhale 2 Puffs by mouth every 6 hours as needed for Shortness of Breath.     • escitalopram (LEXAPRO) 10 MG Tab Take 10 mg by mouth every day.     • infliximab (REMICADE) 100 MG Recon Soln 300 mg by Intravenous route Q 8 WEEKS.     • hydroCHLOROthiazide (HYDRODIURIL) 12.5 MG tablet TAKE 1 TABLET BY MOUTH EVERY DAY (Patient not taking: Reported on 4/5/2019) 90 Tab 3   • [DISCONTINUED] amLODIPine (NORVASC) 10 MG Tab Take 1 Tab by mouth every day. (Patient taking differently: Take 20 mg by mouth every day.) 30 Tab 1   • tiotropium (SPIRIVA) 18 MCG Cap Inhale 18 mcg by mouth every day.       No facility-administered encounter medications on file as of 4/5/2019.      Review of Systems   Constitutional: Negative for malaise/fatigue.   Respiratory: Negative for shortness of breath.    Cardiovascular: Positive for palpitations. Negative for chest pain, orthopnea, leg swelling and PND.   Gastrointestinal: Negative for abdominal pain.   Musculoskeletal: Negative for falls.   Neurological: Negative for dizziness and loss of consciousness.   Psychiatric/Behavioral: Negative for depression.   All other systems reviewed and are negative.       Objective:   /60 (BP Location: Right arm, Patient Position: Sitting)   Pulse 72   Ht 1.651 m (5' 5\")   Wt 61.2 kg (135 lb)   SpO2 90%   BMI 22.47 kg/m²     Physical Exam   Constitutional: She is oriented to person, place, and time. She appears well-developed and well-nourished. No distress.   HENT:   Head: Normocephalic and atraumatic.   Eyes: Conjunctivae are normal. No scleral icterus.   Neck: Normal range of motion. Neck supple.   Cardiovascular: Normal " rate, regular rhythm and normal heart sounds.  Exam reveals no gallop and no friction rub.    No murmur heard.  Pulses:       Carotid pulses are on the left side with bruit.  Pulmonary/Chest: Effort normal and breath sounds normal. No respiratory distress. She has no wheezes. She has no rales.   Abdominal: Soft. She exhibits no distension. There is no tenderness.   Musculoskeletal: She exhibits no edema.   Neurological: She is alert and oriented to person, place, and time.   Skin: Skin is warm and dry. She is not diaphoretic.   Psychiatric: She has a normal mood and affect. Her behavior is normal.   Nursing note and vitals reviewed.    Holter monitor performed December 2018 was personally reviewed and per my interpretation showed normal sinus rhythm.  Frequent PVCs approximately 5% of total beats.  No sustained arrhythmias.    Labs performed in April 2019 were reviewed and showed normal LDL.  Normal creatinine.    Assessment:     1. Bruit of left carotid artery  US-CAROTID DOPPLER BILAT   2. Essential hypertension, benign     3. Other hyperlipidemia     4. Cigarette smoker     5. Palpitations     6. Encounter for long-term (current) use of high-risk medication         Medical Decision Making:  Today's Assessment / Status / Plan:     Palpitations:  Frequent PVCs:  Hypertension:  Her symptoms have improved.  Blood pressure is at goal.  Continue carvedilol along with other antihypertensives at current dose.    Hyperlipidemia: LDL at goal.  Continue Crestor at current dose.    Left carotid bruit: Patient is a smoker.  Carotid duplex ordered today.  She is currently on aspirin.    Tobacco use: Patient continues to smoke on a daily basis.  I have discussed the benefits of smoking cessation.  She is thinking of quitting and will let us know when she is ready.  I spent 5 minutes discussing smoking cessation.    Return to clinic in 6 months to 1 year or earlier if needed.    Thank you for allowing me to participate in the  care of this patient. Please do not hesitate to contact me with any questions.    Deanna Ribera MD  Cardiologist  [facility]      PLEASE NOTE: This dictation was created using voice recognition software.

## 2019-05-06 ENCOUNTER — TELEPHONE (OUTPATIENT)
Dept: CARDIOLOGY | Facility: MEDICAL CENTER | Age: 74
End: 2019-05-06

## 2019-05-06 NOTE — TELEPHONE ENCOUNTER
FW: Edit   Received: Today   Message Contents   SHAYY Barrios R.N.             Labs reviewed. No changes.   Thanks      ======================  Letter mailed to pt

## 2019-11-14 ENCOUNTER — OFFICE VISIT (OUTPATIENT)
Dept: CARDIOLOGY | Facility: CLINIC | Age: 74
End: 2019-11-14
Payer: MEDICARE

## 2019-11-14 VITALS
WEIGHT: 135 LBS | OXYGEN SATURATION: 92 % | HEART RATE: 70 BPM | HEIGHT: 65 IN | BODY MASS INDEX: 22.49 KG/M2 | SYSTOLIC BLOOD PRESSURE: 130 MMHG | DIASTOLIC BLOOD PRESSURE: 80 MMHG

## 2019-11-14 DIAGNOSIS — R00.2 PALPITATIONS: ICD-10-CM

## 2019-11-14 DIAGNOSIS — F17.210 CIGARETTE SMOKER: ICD-10-CM

## 2019-11-14 DIAGNOSIS — E78.2 MIXED HYPERLIPIDEMIA: ICD-10-CM

## 2019-11-14 DIAGNOSIS — I49.3 PVC (PREMATURE VENTRICULAR CONTRACTION): ICD-10-CM

## 2019-11-14 DIAGNOSIS — I10 ESSENTIAL HYPERTENSION, BENIGN: ICD-10-CM

## 2019-11-14 DIAGNOSIS — R01.1 SYSTOLIC MURMUR: ICD-10-CM

## 2019-11-14 DIAGNOSIS — Z79.899 ENCOUNTER FOR LONG-TERM (CURRENT) USE OF HIGH-RISK MEDICATION: ICD-10-CM

## 2019-11-14 PROCEDURE — 99406 BEHAV CHNG SMOKING 3-10 MIN: CPT | Performed by: INTERNAL MEDICINE

## 2019-11-14 PROCEDURE — 99215 OFFICE O/P EST HI 40 MIN: CPT | Mod: 25 | Performed by: INTERNAL MEDICINE

## 2019-11-14 RX ORDER — ROSUVASTATIN CALCIUM 5 MG/1
5 TABLET, COATED ORAL EVERY EVENING
Qty: 90 TAB | Refills: 3 | Status: SHIPPED | OUTPATIENT
Start: 2019-11-14 | End: 2019-11-21 | Stop reason: SDUPTHER

## 2019-11-14 ASSESSMENT — ENCOUNTER SYMPTOMS
ABDOMINAL PAIN: 0
DEPRESSION: 0
SHORTNESS OF BREATH: 0
PALPITATIONS: 0
ORTHOPNEA: 0
FALLS: 0
DIZZINESS: 0
LOSS OF CONSCIOUSNESS: 0
PND: 0

## 2019-11-14 NOTE — LETTER
[facility] [address],   [address] [place] [address]  Phone: [phone]  Fax: [phone]              Jose Chappell  1945    Encounter Date: 11/14/2019    Deanna Ribera M.D.          PROGRESS NOTE:  Chief Complaint   Patient presents with   • HTN (Controlled)       Subjective:   Jose Chappell is a 74-year-old female presenting to clinic for follow-up on hypertension.    Pertinent history:  Hypertension  Hyperlipidemia  Frequent PVCs  Tobacco use    Patient's main concern today is her heart.  Her sister recently had a valve surgery.  She wonders if she has a heart murmur.  She also wants to know if atherosclerosis can be genetic.    Her blood pressures have been well controlled, like today.    She was previously on Crestor 20 mg once daily but was unable to tolerate it due to leg cramps.  Since she stopped it, her leg cramps have completely disappeared.    She continues to smoke about 2 to 3 cigarettes a day.    She has a history of frequent PVCs and denies any palpitations.    Past Medical History:   Diagnosis Date   • Hyperlipidemia    • Hypertension    • Rheumatoid arthritis, adult (HCC)      Past Surgical History:   Procedure Laterality Date   • HYSTERECTOMY, VAGINAL     • TONSILLECTOMY       Family History   Problem Relation Age of Onset   • Other Mother         Breast cancer   • Lung Disease Father         COPD   • Heart Failure Sister    • Heart Attack Sister         MI at a59     Social History     Socioeconomic History   • Marital status:      Spouse name: Not on file   • Number of children: Not on file   • Years of education: Not on file   • Highest education level: Not on file   Occupational History   • Not on file   Social Needs   • Financial resource strain: Not on file   • Food insecurity:     Worry: Not on file     Inability: Not on file   • Transportation needs:     Medical: Not on file    Non-medical: Not on file   Tobacco Use   • Smoking status: Current Every Day Smoker     Packs/day: 1.00     Years: 40.00     Pack years: 40.00     Types: Cigarettes   • Smokeless tobacco: Never Used   Substance and Sexual Activity   • Alcohol use: No   • Drug use: No   • Sexual activity: Yes     Partners: Male     Comment:    Lifestyle   • Physical activity:     Days per week: Not on file     Minutes per session: Not on file   • Stress: Not on file   Relationships   • Social connections:     Talks on phone: Not on file     Gets together: Not on file     Attends Christian service: Not on file     Active member of club or organization: Not on file     Attends meetings of clubs or organizations: Not on file     Relationship status: Not on file   • Intimate partner violence:     Fear of current or ex partner: Not on file     Emotionally abused: Not on file     Physically abused: Not on file     Forced sexual activity: Not on file   Other Topics Concern   • Not on file   Social History Narrative   • Not on file     Allergies   Allergen Reactions   • Statins [Hmg-Coa-R Inhibitors] Myalgia     Outpatient Encounter Medications as of 11/14/2019   Medication Sig Dispense Refill   • rosuvastatin (CRESTOR) 5 MG Tab Take 1 Tab by mouth every evening. 90 Tab 3   • therapeutic multivitamin-minerals (THERAGRAN-M) Tab Take 1 Tab by mouth every day.     • Omega-3 Fatty Acids (FISH OIL) 1000 MG Cap capsule Take 1,000 mg by mouth every day.     • calcium carbonate (OS-LAURYN 500) 1250 (500 Ca) MG Tab Take 500 mg by mouth every day.     • coenzyme Q-10 30 MG capsule Take 60 mg by mouth every day.     • magnesium oxide (MAG-OX) 400 MG Tab Take 400 mg by mouth 1 time daily as needed.     • amLODIPine (NORVASC) 5 MG Tab Take 1 Tab by mouth 2 Times a Day. 180 Tab 3   • aspirin (ASA) 81 MG Chew Tab chewable tablet Take 81 mg by mouth every day.     • carvedilol (COREG) 6.25 MG Tab Take 1 Tab by mouth 2 times a day, with meals. 180 Tab 3   "  • montelukast (SINGULAIR) 10 MG Tab Take 10 mg by mouth every bedtime.     • albuterol 108 (90 BASE) MCG/ACT Aero Soln inhalation aerosol Inhale 2 Puffs by mouth every 6 hours as needed for Shortness of Breath.     • escitalopram (LEXAPRO) 10 MG Tab Take 10 mg by mouth every day.     • infliximab (REMICADE) 100 MG Recon Soln 300 mg by Intravenous route Q 8 WEEKS.     • tiotropium (SPIRIVA) 18 MCG Cap Inhale 18 mcg by mouth every day.       No facility-administered encounter medications on file as of 11/14/2019.      Review of Systems   Constitutional: Negative for malaise/fatigue.   Respiratory: Negative for shortness of breath.    Cardiovascular: Negative for chest pain, palpitations, orthopnea, leg swelling and PND.   Gastrointestinal: Negative for abdominal pain.   Musculoskeletal: Negative for falls.   Neurological: Negative for dizziness and loss of consciousness.   Psychiatric/Behavioral: Negative for depression.   All other systems reviewed and are negative.       Objective:   /80 (BP Location: Right arm, Patient Position: Sitting)   Pulse 70   Ht 1.651 m (5' 5\")   Wt 61.2 kg (135 lb)   SpO2 92%   BMI 22.47 kg/m²      Physical Exam   Constitutional: She is oriented to person, place, and time. She appears well-developed and well-nourished. No distress.   HENT:   Head: Normocephalic and atraumatic.   Eyes: Conjunctivae are normal. No scleral icterus.   Neck: Normal range of motion. Neck supple.   Cardiovascular: Normal rate and regular rhythm. Exam reveals no gallop and no friction rub.   Murmur heard.   Crescendo decrescendo systolic murmur is present with a grade of 1/6.  Pulses:       Carotid pulses are on the left side with bruit.  Pulmonary/Chest: Effort normal and breath sounds normal. No respiratory distress. She has no wheezes. She has no rales.   Abdominal: Soft. She exhibits no distension. There is no tenderness.   Musculoskeletal:         General: No edema.   Neurological: She is alert " and oriented to person, place, and time.   Skin: Skin is warm and dry. She is not diaphoretic.   Psychiatric: She has a normal mood and affect. Her behavior is normal.   Nursing note and vitals reviewed.    Holter monitor performed December 2018 showed normal sinus rhythm.  Frequent PVCs approximately 5% of total beats.  No sustained arrhythmias.    Labs performed in November 2019 were reviewed and showed normal creatinine and hemoglobin.    Carotid duplex performed in April 2019.  Report was reviewed and did not show any significant carotid stenosis.    Assessment:     1. Essential hypertension, benign     2. Mixed hyperlipidemia     3. Cigarette smoker     4. Encounter for long-term (current) use of high-risk medication     5. Palpitations     6. PVC (premature ventricular contraction)     7. Systolic murmur  EC-ECHOCARDIOGRAM COMPLETE W/O CONT       Medical Decision Making:  Today's Assessment / Status / Plan:     Systolic murmur: Concerning for aortic sclerosis versus possibly stenosis.  Her last echocardiogram in 2017 showed aortic sclerosis.  Echocardiogram ordered today for further evaluation.    Frequent PVCs: Patient denies any recurrent palpitations.  Continue carvedilol at current dose.    Hypertension: Blood pressure is at goal.  Continue current medication regimen.    Hyperlipidemia: Her last LDL in April was at goal.  However this test result was while she was on Crestor.  I have discussed the risk of not being on statins and the patient is agreeable to trying a lower dose Crestor.  5 mg daily ordered today.    Tobacco use: Patient continues to smoke on a daily basis.  I have discussed the benefits of smoking cessation.  Patient is thinking about quitting but is not quite ready yet.  I spent 4 minutes discussing smoking cessation.    Patient's questions about atherosclerosis were ordered today    Return to clinic in 6 months to 1 year or earlier if needed.    Thank you for allowing me to participate in  the care of this patient. Please do not hesitate to contact me with any questions.    Deanna Ribera MD  Cardiologist  [facility]      PLEASE NOTE: This dictation was created using voice recognition software.         CARI Garcia  [address] [place] [address]  VIA Facsimile: [phone]

## 2019-11-14 NOTE — PROGRESS NOTES
Chief Complaint   Patient presents with   • HTN (Controlled)       Subjective:   Jose Chappell is a 74-year-old female presenting to clinic for follow-up on hypertension.    Pertinent history:  Hypertension  Hyperlipidemia  Frequent PVCs  Tobacco use    Patient's main concern today is her heart.  Her sister recently had a valve surgery.  She wonders if she has a heart murmur.  She also wants to know if atherosclerosis can be genetic.    Her blood pressures have been well controlled, like today.    She was previously on Crestor 20 mg once daily but was unable to tolerate it due to leg cramps.  Since she stopped it, her leg cramps have completely disappeared.    She continues to smoke about 2 to 3 cigarettes a day.    She has a history of frequent PVCs and denies any palpitations.    Past Medical History:   Diagnosis Date   • Hyperlipidemia    • Hypertension    • Rheumatoid arthritis, adult (HCC)      Past Surgical History:   Procedure Laterality Date   • HYSTERECTOMY, VAGINAL     • TONSILLECTOMY       Family History   Problem Relation Age of Onset   • Other Mother         Breast cancer   • Lung Disease Father         COPD   • Heart Failure Sister    • Heart Attack Sister         MI at a59     Social History     Socioeconomic History   • Marital status:      Spouse name: Not on file   • Number of children: Not on file   • Years of education: Not on file   • Highest education level: Not on file   Occupational History   • Not on file   Social Needs   • Financial resource strain: Not on file   • Food insecurity:     Worry: Not on file     Inability: Not on file   • Transportation needs:     Medical: Not on file     Non-medical: Not on file   Tobacco Use   • Smoking status: Current Every Day Smoker     Packs/day: 1.00     Years: 40.00     Pack years: 40.00     Types: Cigarettes   • Smokeless tobacco: Never Used   Substance and Sexual Activity   • Alcohol use: No   • Drug use: No   • Sexual activity: Yes      Partners: Male     Comment:    Lifestyle   • Physical activity:     Days per week: Not on file     Minutes per session: Not on file   • Stress: Not on file   Relationships   • Social connections:     Talks on phone: Not on file     Gets together: Not on file     Attends Sikh service: Not on file     Active member of club or organization: Not on file     Attends meetings of clubs or organizations: Not on file     Relationship status: Not on file   • Intimate partner violence:     Fear of current or ex partner: Not on file     Emotionally abused: Not on file     Physically abused: Not on file     Forced sexual activity: Not on file   Other Topics Concern   • Not on file   Social History Narrative   • Not on file     Allergies   Allergen Reactions   • Statins [Hmg-Coa-R Inhibitors] Myalgia     Outpatient Encounter Medications as of 11/14/2019   Medication Sig Dispense Refill   • rosuvastatin (CRESTOR) 5 MG Tab Take 1 Tab by mouth every evening. 90 Tab 3   • therapeutic multivitamin-minerals (THERAGRAN-M) Tab Take 1 Tab by mouth every day.     • Omega-3 Fatty Acids (FISH OIL) 1000 MG Cap capsule Take 1,000 mg by mouth every day.     • calcium carbonate (OS-LAURYN 500) 1250 (500 Ca) MG Tab Take 500 mg by mouth every day.     • coenzyme Q-10 30 MG capsule Take 60 mg by mouth every day.     • magnesium oxide (MAG-OX) 400 MG Tab Take 400 mg by mouth 1 time daily as needed.     • amLODIPine (NORVASC) 5 MG Tab Take 1 Tab by mouth 2 Times a Day. 180 Tab 3   • aspirin (ASA) 81 MG Chew Tab chewable tablet Take 81 mg by mouth every day.     • carvedilol (COREG) 6.25 MG Tab Take 1 Tab by mouth 2 times a day, with meals. 180 Tab 3   • montelukast (SINGULAIR) 10 MG Tab Take 10 mg by mouth every bedtime.     • albuterol 108 (90 BASE) MCG/ACT Aero Soln inhalation aerosol Inhale 2 Puffs by mouth every 6 hours as needed for Shortness of Breath.     • escitalopram (LEXAPRO) 10 MG Tab Take 10 mg by mouth every day.     •  "infliximab (REMICADE) 100 MG Recon Soln 300 mg by Intravenous route Q 8 WEEKS.     • tiotropium (SPIRIVA) 18 MCG Cap Inhale 18 mcg by mouth every day.       No facility-administered encounter medications on file as of 11/14/2019.      Review of Systems   Constitutional: Negative for malaise/fatigue.   Respiratory: Negative for shortness of breath.    Cardiovascular: Negative for chest pain, palpitations, orthopnea, leg swelling and PND.   Gastrointestinal: Negative for abdominal pain.   Musculoskeletal: Negative for falls.   Neurological: Negative for dizziness and loss of consciousness.   Psychiatric/Behavioral: Negative for depression.   All other systems reviewed and are negative.       Objective:   /80 (BP Location: Right arm, Patient Position: Sitting)   Pulse 70   Ht 1.651 m (5' 5\")   Wt 61.2 kg (135 lb)   SpO2 92%   BMI 22.47 kg/m²     Physical Exam   Constitutional: She is oriented to person, place, and time. She appears well-developed and well-nourished. No distress.   HENT:   Head: Normocephalic and atraumatic.   Eyes: Conjunctivae are normal. No scleral icterus.   Neck: Normal range of motion. Neck supple.   Cardiovascular: Normal rate and regular rhythm. Exam reveals no gallop and no friction rub.   Murmur heard.   Crescendo decrescendo systolic murmur is present with a grade of 1/6.  Pulses:       Carotid pulses are on the left side with bruit.  Pulmonary/Chest: Effort normal and breath sounds normal. No respiratory distress. She has no wheezes. She has no rales.   Abdominal: Soft. She exhibits no distension. There is no tenderness.   Musculoskeletal:         General: No edema.   Neurological: She is alert and oriented to person, place, and time.   Skin: Skin is warm and dry. She is not diaphoretic.   Psychiatric: She has a normal mood and affect. Her behavior is normal.   Nursing note and vitals reviewed.    Holter monitor performed December 2018 showed normal sinus rhythm.  Frequent PVCs " approximately 5% of total beats.  No sustained arrhythmias.    Labs performed in November 2019 were reviewed and showed normal creatinine and hemoglobin.    Carotid duplex performed in April 2019.  Report was reviewed and did not show any significant carotid stenosis.    Assessment:     1. Essential hypertension, benign     2. Mixed hyperlipidemia     3. Cigarette smoker     4. Encounter for long-term (current) use of high-risk medication     5. Palpitations     6. PVC (premature ventricular contraction)     7. Systolic murmur  EC-ECHOCARDIOGRAM COMPLETE W/O CONT       Medical Decision Making:  Today's Assessment / Status / Plan:     Systolic murmur: Concerning for aortic sclerosis versus possibly stenosis.  Her last echocardiogram in 2017 showed aortic sclerosis.  Echocardiogram ordered today for further evaluation.    Frequent PVCs: Patient denies any recurrent palpitations.  Continue carvedilol at current dose.    Hypertension: Blood pressure is at goal.  Continue current medication regimen.    Hyperlipidemia: Her last LDL in April was at goal.  However this test result was while she was on Crestor.  I have discussed the risk of not being on statins and the patient is agreeable to trying a lower dose Crestor.  5 mg daily ordered today.    Tobacco use: Patient continues to smoke on a daily basis.  I have discussed the benefits of smoking cessation.  Patient is thinking about quitting but is not quite ready yet.  I spent 4 minutes discussing smoking cessation.    Patient's questions about atherosclerosis were ordered today    Return to clinic in 6 months to 1 year or earlier if needed.    Thank you for allowing me to participate in the care of this patient. Please do not hesitate to contact me with any questions.    Deanna Ribera MD  Cardiologist  [facility]      PLEASE NOTE: This dictation was created using voice recognition software.

## 2019-11-21 ENCOUNTER — TELEPHONE (OUTPATIENT)
Dept: CARDIOLOGY | Facility: MEDICAL CENTER | Age: 74
End: 2019-11-21

## 2019-11-21 DIAGNOSIS — E78.2 MIXED HYPERLIPIDEMIA: Primary | ICD-10-CM

## 2019-11-21 RX ORDER — ROSUVASTATIN CALCIUM 5 MG/1
5 TABLET, COATED ORAL EVERY EVENING
Qty: 90 TAB | Refills: 3 | Status: SHIPPED | OUTPATIENT
Start: 2019-11-21 | End: 2020-09-08 | Stop reason: SDUPTHER

## 2019-11-21 NOTE — TELEPHONE ENCOUNTER
Orquidea,       Patient is calling about her prescription for Rosuvastatin. She received a printed RX and she was going to send it to [facility], but she wants to know if she could get it here cheaper, so she's asking for it to be called in to [facility] in [place]. Pt. #[phone].

## 2020-01-09 DIAGNOSIS — I47.10 PSVT (PAROXYSMAL SUPRAVENTRICULAR TACHYCARDIA) (HCC): ICD-10-CM

## 2020-01-09 DIAGNOSIS — I10 HYPERTENSION, UNSPECIFIED TYPE: ICD-10-CM

## 2020-01-14 RX ORDER — CARVEDILOL 6.25 MG/1
6.25 TABLET ORAL 2 TIMES DAILY WITH MEALS
Qty: 180 TAB | Refills: 3 | Status: SHIPPED | OUTPATIENT
Start: 2020-01-14 | End: 2021-03-01

## 2020-04-08 DIAGNOSIS — I10 ESSENTIAL HYPERTENSION: Primary | ICD-10-CM

## 2020-04-08 RX ORDER — AMLODIPINE BESYLATE 5 MG/1
5 TABLET ORAL 2 TIMES DAILY
Qty: 180 TAB | Refills: 2 | Status: SHIPPED | OUTPATIENT
Start: 2020-04-08 | End: 2021-03-01

## 2020-07-05 PROBLEM — M05.79 SEROPOSITIVE RHEUMATOID ARTHRITIS OF MULTIPLE SITES (HCC): Status: ACTIVE | Noted: 2020-07-05

## 2020-08-28 ENCOUNTER — OFFICE VISIT (OUTPATIENT)
Dept: CARDIOLOGY | Facility: CLINIC | Age: 75
End: 2020-08-28
Payer: MEDICARE

## 2020-08-28 VITALS
DIASTOLIC BLOOD PRESSURE: 80 MMHG | BODY MASS INDEX: 21.66 KG/M2 | SYSTOLIC BLOOD PRESSURE: 140 MMHG | HEIGHT: 65 IN | WEIGHT: 130 LBS | HEART RATE: 64 BPM | OXYGEN SATURATION: 90 %

## 2020-08-28 DIAGNOSIS — R01.1 SYSTOLIC MURMUR: ICD-10-CM

## 2020-08-28 DIAGNOSIS — I49.3 PVC (PREMATURE VENTRICULAR CONTRACTION): ICD-10-CM

## 2020-08-28 DIAGNOSIS — Z79.899 ENCOUNTER FOR LONG-TERM (CURRENT) USE OF HIGH-RISK MEDICATION: ICD-10-CM

## 2020-08-28 DIAGNOSIS — I10 ESSENTIAL HYPERTENSION, BENIGN: ICD-10-CM

## 2020-08-28 DIAGNOSIS — E78.2 MIXED HYPERLIPIDEMIA: ICD-10-CM

## 2020-08-28 PROCEDURE — 99215 OFFICE O/P EST HI 40 MIN: CPT | Performed by: INTERNAL MEDICINE

## 2020-08-28 ASSESSMENT — ENCOUNTER SYMPTOMS
FALLS: 0
DIZZINESS: 0
PALPITATIONS: 0
DEPRESSION: 0
ABDOMINAL PAIN: 0
LOSS OF CONSCIOUSNESS: 0
SHORTNESS OF BREATH: 0
PND: 0
ORTHOPNEA: 0

## 2020-08-28 ASSESSMENT — FIBROSIS 4 INDEX: FIB4 SCORE: 0.95
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
## 2020-09-08 DIAGNOSIS — E78.2 MIXED HYPERLIPIDEMIA: ICD-10-CM

## 2020-09-08 RX ORDER — ROSUVASTATIN CALCIUM 5 MG/1
5 TABLET, COATED ORAL EVERY EVENING
Qty: 30 TAB | Refills: 1 | Status: SHIPPED | OUTPATIENT
Start: 2020-09-08 | End: 2020-09-10

## 2020-12-22 ENCOUNTER — TELEPHONE (OUTPATIENT)
Dept: CARDIOLOGY | Facility: MEDICAL CENTER | Age: 75
End: 2020-12-22

## 2020-12-22 DIAGNOSIS — E78.2 MIXED HYPERLIPIDEMIA: ICD-10-CM

## 2020-12-22 RX ORDER — ROSUVASTATIN CALCIUM 5 MG/1
5 TABLET, COATED ORAL DAILY
Qty: 90 TAB | Refills: 3 | Status: SHIPPED | OUTPATIENT
Start: 2020-12-22 | End: 2022-03-31 | Stop reason: SDUPTHER

## 2020-12-22 NOTE — TELEPHONE ENCOUNTER
AA    PH: [phone]   PT NM: ChappellJose   : 47   RE: Patient needs a refill of her  Rosuvastatin. Please call asap.     --------------------------------------  Message History  Account: [id number]  Taken:  Tue 22-Dec-2020  3:02p [facility]  Serial#: [id number]

## 2020-12-23 NOTE — ADDENDUM NOTE
Addended by: DIONICIO SWANSON on: 12/22/2020 06:50 PM     Modules accepted: Orders     83 y/o RH-F w/ h/o Myasthenia Gravis on mestinon 60mg TID and 180 mg Mestinon extended release at night, Continues on Eculizumab (Solaris) for MG, tapering dose of prednisone, HTN and recent acute hypoxemic respiratory failure with sepsis in setting of COVID-19 infection s/p solumedrol 4/3-4/7 + Plaquenil 3/31-4/4 who presented for plasma COVID therapy, S/P Convalescence Plasma 4/24, here for continued hypoxia, improving slowly

## 2020-12-23 NOTE — TELEPHONE ENCOUNTER
Called the pharmacy and they stated they did not receive the refills. Resent RX.  Notified the patient.

## 2020-12-28 NOTE — TELEPHONE ENCOUNTER
I have ordered carotid doppler  yolande  
PT called with message. No answer. Left VM.  
To Dr. Davis for review  MMR  ----- Message from Anna Marie Gaspar, Med Ass't sent at 1/20/2017 10:07 AM PST -----  Regarding: order for carotid ultrasound   Contact: [phone]  AM/Jossy     Pt had an Echo on 1/19 and she was advised that she needs an order for her Carotid ultrasound. She is needing us to put the order into the system. She is hoping to have it done on 1/25 when she has her other test.     Please call her at [phone]   
no

## 2021-07-15 ENCOUNTER — TELEPHONE (OUTPATIENT)
Dept: CARDIOLOGY | Facility: MEDICAL CENTER | Age: 76
End: 2021-07-15

## 2021-07-15 NOTE — TELEPHONE ENCOUNTER
Faxed lab orders to [facility] for patient to get them done.  FAX:[phone]

## 2021-07-26 ENCOUNTER — OFFICE VISIT (OUTPATIENT)
Dept: CARDIOLOGY | Facility: CLINIC | Age: 76
End: 2021-07-26
Payer: MEDICARE

## 2021-07-26 VITALS
HEIGHT: 65 IN | WEIGHT: 136 LBS | DIASTOLIC BLOOD PRESSURE: 64 MMHG | HEART RATE: 54 BPM | BODY MASS INDEX: 22.66 KG/M2 | OXYGEN SATURATION: 96 % | SYSTOLIC BLOOD PRESSURE: 104 MMHG

## 2021-07-26 DIAGNOSIS — R06.09 DOE (DYSPNEA ON EXERTION): ICD-10-CM

## 2021-07-26 DIAGNOSIS — Z78.9 STATIN INTOLERANCE: ICD-10-CM

## 2021-07-26 DIAGNOSIS — I47.10 PSVT (PAROXYSMAL SUPRAVENTRICULAR TACHYCARDIA) (HCC): ICD-10-CM

## 2021-07-26 DIAGNOSIS — I10 ESSENTIAL HYPERTENSION, BENIGN: ICD-10-CM

## 2021-07-26 DIAGNOSIS — I49.3 PVC (PREMATURE VENTRICULAR CONTRACTION): ICD-10-CM

## 2021-07-26 DIAGNOSIS — I25.10 CORONARY ARTERY CALCIFICATION SEEN ON CT SCAN: ICD-10-CM

## 2021-07-26 DIAGNOSIS — E78.00 PURE HYPERCHOLESTEROLEMIA: ICD-10-CM

## 2021-07-26 DIAGNOSIS — Z87.891 FORMER TOBACCO USE: ICD-10-CM

## 2021-07-26 DIAGNOSIS — J43.2 CENTRILOBULAR EMPHYSEMA (HCC): ICD-10-CM

## 2021-07-26 DIAGNOSIS — Z82.49 FAMILY HISTORY OF CORONARY ARTERY DISEASE IN SISTER: ICD-10-CM

## 2021-07-26 DIAGNOSIS — E55.9 VITAMIN D DEFICIENCY: ICD-10-CM

## 2021-07-26 DIAGNOSIS — F17.210 CIGARETTE SMOKER: ICD-10-CM

## 2021-07-26 DIAGNOSIS — R01.1 SYSTOLIC MURMUR: ICD-10-CM

## 2021-07-26 DIAGNOSIS — I10 ESSENTIAL HYPERTENSION: ICD-10-CM

## 2021-07-26 DIAGNOSIS — R00.2 PALPITATIONS: ICD-10-CM

## 2021-07-26 DIAGNOSIS — Z79.899 ENCOUNTER FOR LONG-TERM (CURRENT) USE OF HIGH-RISK MEDICATION: ICD-10-CM

## 2021-07-26 DIAGNOSIS — M05.79 SEROPOSITIVE RHEUMATOID ARTHRITIS OF MULTIPLE SITES (HCC): ICD-10-CM

## 2021-07-26 DIAGNOSIS — R07.89 OTHER CHEST PAIN: ICD-10-CM

## 2021-07-26 LAB — EKG IMPRESSION: NORMAL

## 2021-07-26 PROCEDURE — 99214 OFFICE O/P EST MOD 30 MIN: CPT | Performed by: INTERNAL MEDICINE

## 2021-07-26 PROCEDURE — 93000 ELECTROCARDIOGRAM COMPLETE: CPT | Performed by: INTERNAL MEDICINE

## 2021-07-26 RX ORDER — AZELASTINE HCL 205.5 UG/1
SPRAY NASAL
COMMUNITY
End: 2021-07-26

## 2021-07-26 RX ORDER — FLUOCINONIDE 0.5 MG/G
OINTMENT TOPICAL
COMMUNITY
Start: 2021-04-21 | End: 2022-03-02

## 2021-07-26 RX ORDER — BUDESONIDE 0.5 MG/2ML
INHALANT ORAL
COMMUNITY
Start: 2021-06-16 | End: 2022-04-20

## 2021-07-26 RX ORDER — AZELASTINE HCL 205.5 UG/1
2 SPRAY NASAL 2 TIMES DAILY
COMMUNITY
Start: 2021-05-10

## 2021-07-26 ASSESSMENT — FIBROSIS 4 INDEX: FIB4 SCORE: 1.042572070285373814

## 2021-07-26 NOTE — PATIENT INSTRUCTIONS
"-Anyone who lives long enough and with certain risk factors were have hardening of the heart arteries.  It does not tell us if the arteries are blocked on the inside of the outside of the pipes.    -Taking an aspirin every day can help in the setting of a sudden heart attack.  Taking 81 mg every day is reasonable.    -Your rosuvastatin is a very powerful heart medicine.  It is not just a cholesterol medicine.  It reduces inflammation in the blood vessels and helps reduce growth of heart artery blockages.    Coronary artery calcification:    A CT scan is a still image of the body but the heart is always moving so everything seen around the heart is blurry.  \"Coronary artery calcification\" is a way of describing that you have calcified disease in the blood vessels to the heart. A regular CT scan cannot tell if the calcification is on the outside or inside, it simply tells whether or not it is present.  On a regular CT scan the radiologist typically categorizes it as mild, moderate or severe which is fairly subjective.  A Calcium Score CT scan gives an objective measurement of the amount of calcification.     Most people over 70 have some degree of calcified heart disease so I typically do not order calcium scores and I am not alarmed by calcified heart disease on a regular CT scan. I only order a calcium score for the purpose of determining if someone is likely to benefit from statin therapy.    Blockages in the heart artery should only have a stent or bypass if they are more than 70% blocked (at which point most people have symptoms such as chest pain or unusual shortness of breath with activity that goes away with rest).   People having a sudden heart attack should have a stent placed in the blocked artery.   People having chest pain that limits their ability to function could consider having a stent placed in the artery.  Otherwise, medication and lifestyle changes are the preferred treatments. Stents placed " "outside of severe symptoms or sudden heart attacks do not reduce mortality (likelihood of dying from a heart attack) and often people end up needing a stent within a stent later.  Lifestyle and medications are more important than stents or bypass under most circumstances.    The only things to do for calcified heart disease to lower your risk of sudden heart attack (or stroke which is the same disease but a different location- in the brain):    -Take a statin medication (as a vascular medication, not just a cholesterol medication) which is our most powerful weapon to stabilize the plaques and reduce inflammation making them less likely to rupture open and cause a sudden heart attack/stroke.   -Keep LDL cholesterol under 100 (or under 70 if you have already had a heart attack/stroke).  -Control blood pressure, under 130/80, ideally closer to 120/80.  -Control blood sugar, don't get diabetes.  -Don't smoke.  -Eat a heart healthy diet that reduces inflammation: Pritikin, Mediterranean, Vegetarian, Vegan  -Get regular exercise: 150 minutes of moderate exercise OR 75 minutes of very vigorous exercise every week.  -Keep your body mass index under 30, ideal BMI is 20-25.  -Know the signs and symptoms of heart attack and stroke and when to call 9-1-1.  -Shoveling snow can provoke a heart attack because the cold weather constricts heart arteries and heavy/prolonged straining can reduce blood flow down heart arteries.    Signs of a stroke: sudden inability to talk, smile on one side, confusion, inability to move arm/leg on one side, numbness/tingling of arm/leg on one side that is not typical. \"Think FAST.\"  F=face drooping  A=arm weakness  S=Speech difficulty  T= time to call 911 (do not give ride to someone, call ambulance to alert the hospital to start stroke protocol)    Signs of a heart attack: Anything very intense coming from the chest that lasts more than 15 minutes, consider calling 911.  -Pain or pressure in the " chest that is intense, lasts more than 15 minutes, not explained by other known reasons such as known/similar heartburn  -It can feel like severe heart burn but associated with nausea, vomiting  -It can be vague pain that radiates to the neck, jaw, shoulders, arm/arms, wrap around your back or even cause a toothache  -Can be associated with unusual shortness of breath at rest or sense of impending doom      Please look into the following diets:    Mediterranean diet.  Pritikin - used at [facility], probably one of the best for anti-inflammatory  DASH DIET - American Heart Association (mostly for hypertension)  Ornish Diet   Vegan diet (proven to reverse vascular disease)    Resources to learn more:  American Heart Association   Www.Cardiosmart.org, sponsored by the American College of cardiology.      -If you start having chest tightness that is keeping you from being physically active the way you would like, we would consider doing a left heart angiogram in [place].  My partner would sneak a catheter up to the heart arteries and inject contrast dye and see if there is a blockage that needs an intervention.  This is what your sister would have had done before bypass and when she had a stent put in her heart.    -I suspect that your shortness of breath with activity is more related to lungs at this point.    -If your heart palpitations begin to bother you more, we can change your carvedilol to a medication called metoprolol which is sometimes better for controlling palpitations.  I would start with metoprolol tartrate 25 mg a.m. and p.m.  Let us know if you want to make this change.    -If your heart palpitations are progressing, we can also get a heart monitor on you called a Zio patch heart monitor that you can wear for 2 weeks.

## 2021-07-26 NOTE — LETTER
[facility] [place] [address],   [address]nd [address]  [place] [address]  Phone: [phone]  Fax: [phone]              Jose Chappell  1945    Encounter Date: 2021    Berna Arnett M.D.          PROGRESS NOTE:  Subjective:   Chief Complaint:   Chief Complaint   Patient presents with   • Hypertension     F/V DX:Essential hypertension, benign       Jose Chappell is a 76 y.o. female who returns today for HTN (labile at times), HLP, palpitations, SVT, PVCs,  and today HARGROVE and coronary artery calcification, statin intolerance, former smoker, rheumatoid arthritis, lightheadedness, family history CAD.    She started seeing cardiology for HTN.    HTN, BP labile at times.  BP at home 157-160 and 120, today lower.    HLP, LDL drawn recently, I am waiting for the results  Statin intolerance but able to take Crestor 5 mg once daily.  Also takes co-Q10.    Quit smoking in 2018.    Has palpitations, heart will race, can last up to 5 min, feels it is getting worse, makes her feel lightheaded.  No syncope.  Has been lightheaded at times standing up, has to wait for it to pass.  Not sure what her BP is at the time.  PVCs, on BB. Holter 2018 with 5% burden.  Brief 4 beat atrial runs noted.    Has HARGROVE, [facility] 2-3, related to lungs  Possible mild orthopnea but  no lower extremity swelling.   Seeing Dr. Efrain Albrecht now for allergies and PFTS.    Sees Dr. Conner Mullen, rheumatologist for Remicade injections for rheumatoid arthritis.    [place].  Every once in a while if walking too quickly, will feel some chest tightness or heaviness.  She will sit down to rest and it goes away  No symptoms of leg claudication.   Having nerve pains in legs when sitting and also cramps at night.  No stroke/TIA like symptoms.    No family history of premature coronary artery disease.  Parents  young.  Sister with open heart surgery and stent,  started in her 60s.  Sister had mitral valve clip for mitral regurgitation.    No history of diabetes.  No history of autoimmune disease such as lupus or rheumatoid arthritis.  No chronic kidney disease.  No ETOH overuse.  No caffeine overuse.  No recreation substance use.    Lives with son and  in [place].  From [facility].    DATA REVIEWED by me:  ECG (my personal interpretation) 7-26-21  Sinus,     Holter monitor performed December 2018 showed normal sinus rhythm.  Frequent PVCs approximately 5% of total beats.  No sustained arrhythmias.  4 beat atrial runs and 4 beat ventricular runs.     Echo 12-3-2019   Normal left ventricular systolic function. Left ventricular ejection   fraction is visually estimated to be 60%.  Normal diastolic function.  Normal inferior vena cava size and inspiratory collapse.  Aortic sclerosis without stenosis.  Estimated right ventricular systolic pressure  is 29 mmHg.  Carotid duplex performed in April 2019 did not show any significant carotid stenosis.     CT scan of the chest without contrast Glenn Arechiga 5/28/2021  COPD and emphysema with scattered pleural-parenchymal scarring without suspicious nodules, heavy coronary artery atherosclerosis    Most recent labs:       Lab Results   Component Value Date/Time    WBC 8.5 07/07/2021 11:13 AM    HEMOGLOBIN 14.6 07/07/2021 11:13 AM    HEMATOCRIT 44.0 07/07/2021 11:13 AM    MCV 93.0 07/07/2021 11:13 AM    INR 0.99 10/08/2018 05:36 PM      Lab Results   Component Value Date/Time    SODIUM 139 07/07/2021 11:13 AM    POTASSIUM 4.5 07/07/2021 11:13 AM    CHLORIDE 103 07/07/2021 11:13 AM    CO2 27 07/07/2021 11:13 AM    GLUCOSE 99 07/07/2021 11:13 AM    BUN 16 07/07/2021 11:13 AM    CREATININE 0.8 07/07/2021 11:13 AM      Lab Results   Component Value Date/Time    ASTSGOT 19 07/07/2021 11:13 AM    ALTSGPT 23 07/07/2021 11:13 AM    ALBUMIN 3.6 07/07/2021 11:13 AM      Lab Results   Component Value Date/Time    CHOLSTRLTOT 191 01/25/2017  11:42 AM     (H) 01/25/2017 11:42 AM    HDL 48 01/25/2017 11:42 AM    TRIGLYCERIDE 103 01/25/2017 11:42 AM     No results for input(s): NTPROBNP, TROPONINT in the last 72 hours.      Past Medical History:   Diagnosis Date   • Hyperlipidemia    • Hypertension    • Rheumatoid arthritis, adult (HCC)      Past Surgical History:   Procedure Laterality Date   • HYSTERECTOMY, VAGINAL     • TONSILLECTOMY       Family History   Problem Relation Age of Onset   • Other Mother         Breast cancer   • Lung Disease Father         COPD   • Heart Failure Sister    • Heart Attack Sister         MI at a59     Social History     Socioeconomic History   • Marital status:      Spouse name: Not on file   • Number of children: Not on file   • Years of education: Not on file   • Highest education level: Not on file   Occupational History   • Not on file   Tobacco Use   • Smoking status: Current Every Day Smoker     Packs/day: 1.00     Years: 40.00     Pack years: 40.00     Types: Cigarettes   • Smokeless tobacco: Never Used   Substance and Sexual Activity   • Alcohol use: No   • Drug use: No   • Sexual activity: Yes     Partners: Male     Comment:    Other Topics Concern   • Not on file   Social History Narrative   • Not on file     Social Determinants of Health     Financial Resource Strain:    • Difficulty of Paying Living Expenses:    Food Insecurity:    • Worried About Running Out of Food in the Last Year:    • Ran Out of Food in the Last Year:    Transportation Needs:    • Lack of Transportation (Medical):    • Lack of Transportation (Non-Medical):    Physical Activity:    • Days of Exercise per Week:    • Minutes of Exercise per Session:    Stress:    • Feeling of Stress :    Social Connections:    • Frequency of Communication with Friends and Family:    • Frequency of Social Gatherings with Friends and Family:    • Attends Oriental orthodox Services:    • Active Member of Clubs or Organizations:    • Attends Club or  Organization Meetings:    • Marital Status:    Intimate Partner Violence:    • Fear of Current or Ex-Partner:    • Emotionally Abused:    • Physically Abused:    • Sexually Abused:      Allergies   Allergen Reactions   • Statins [Hmg-Coa-R Inhibitors] Myalgia   • Sulfacetamide Unspecified       Current Outpatient Medications   Medication Sig Dispense Refill   • Azelastine HCl 0.15 % Solution Administer 2 Sprays into affected nostril(S) 2 times a day. Each Nostril     • budesonide (PULMICORT) 0.5 MG/2ML Suspension ONE VIAL INHALATION THREE TIMES A DAY. DO NOT MIX WITH OTHER NEBULIZED MEDICATION.     • fluocinonide (LIDEX) 0.05 % Ointment Apply  topically.     • Fluticasone-Umeclidin-Vilant (TRELEGY ELLIPTA) 200-62.5-25 MCG/INH AEROSOL POWDER, BREATH ACTIVATED 1 puff(s)     • TRELEGY ELLIPTA 200-62.5-25 MCG/INH AEROSOL POWDER, BREATH ACTIVATED      • carvedilol (COREG) 6.25 MG Tab TAKE 1 TABLET BY MOUTH TWICE DAILY WITH MEALS 180 tablet 1   • amLODIPine (NORVASC) 5 MG Tab TAKE 1 TABLET BY MOUTH TWICE DAILY 180 tablet 1   • rosuvastatin (CRESTOR) 5 MG Tab Take 1 Tab by mouth every day. 90 Tab 3   • therapeutic multivitamin-minerals (THERAGRAN-M) Tab Take 1 Tab by mouth every day.     • Omega-3 Fatty Acids (FISH OIL) 1000 MG Cap capsule Take 1,000 mg by mouth every day.     • calcium carbonate (OS-LUARYN 500) 1250 (500 Ca) MG Tab Take 500 mg by mouth every day.     • coenzyme Q-10 30 MG capsule Take 60 mg by mouth every day.     • magnesium oxide (MAG-OX) 400 MG Tab Take 400 mg by mouth 1 time daily as needed.     • aspirin (ASA) 81 MG Chew Tab chewable tablet Take 81 mg by mouth every day.     • montelukast (SINGULAIR) 10 MG Tab Take 10 mg by mouth every bedtime.     • albuterol 108 (90 BASE) MCG/ACT Aero Soln inhalation aerosol Inhale 2 Puffs by mouth every 6 hours as needed for Shortness of Breath.     • escitalopram (LEXAPRO) 10 MG Tab Take 10 mg by mouth every day.     • infliximab (REMICADE) 100 MG Recon Soln 300 mg  "by Intravenous route Q 8 WEEKS.     • tiotropium (SPIRIVA) 18 MCG Cap Inhale 18 mcg by mouth every day.       No current facility-administered medications for this visit.       ROS  All others systems reviewed and negative.     Objective:     /64 (BP Location: Right arm, Patient Position: Sitting, BP Cuff Size: Adult)   Pulse (!) 54   Ht 1.651 m (5' 5\")   Wt 61.7 kg (136 lb)   SpO2 96%  Body mass index is 22.63 kg/m².    General: No acute distress. Well nourished.  HEENT: EOM grossly intact, no scleral icterus, no pharyngeal erythema.   Neck:  No JVD, no bruits, trachea midline  CVS: RRR.  Distant heart sounds, No camden M/R/G. No LE edema.  2+ radial pulses, 1+ PT pulses  Resp: Inspiratory and expiratory rhonchi, mildly prolonged expiratory phase normal respiratory effort.  Abdomen: Soft, NT, no camden hepatomegaly.  MSK/Ext: No clubbing or cyanosis.  Mild kyphosis  Skin: Warm and dry, no rashes.  Neurological: CN III-XII grossly intact. No focal deficits.   Psych: A&O x 3, appropriate affect, good judgement        Assessment:     1. PSVT (paroxysmal supraventricular tachycardia) (HCC)     2. Essential hypertension     3. PVC (premature ventricular contraction)     4. Essential hypertension, benign     5. Systolic murmur     6. Encounter for long-term (current) use of high-risk medication     7. Cigarette smoker     8. Palpitations     9. HARGROVE (dyspnea on exertion)     10. Statin intolerance     11. Former tobacco use     12. Seropositive rheumatoid arthritis of multiple sites (HCC)     13. Coronary artery calcification seen on CT scan     14. Family history of coronary artery disease in sister     15. Other chest pain  EKG   16. Pure hypercholesterolemia     17. Vitamin D deficiency  VITAMIN D,25 HYDROXY   18. Centrilobular emphysema (HCC)         Medical Decision Making:  Today's Assessment / Status / Plan:     -Terms of coronary artery disease, on primary prevention statin  -See below, monitor for " "symptoms  -Would be reasonable to add a primary prevention aspirin  -Blood pressure labile at times  -Lightheadedness persist at times, she will try to check her blood pressure  -Instructions regarding palpitations of medications below  -Dyspnea on exertion probably more lungs and heart rate now, see below  -Return to clinic in 4 months, further discuss if you want to get more aggressive with her care        Written instructions given today:      -Anyone who lives long enough and with certain risk factors were have hardening of the heart arteries.  It does not tell us if the arteries are blocked on the inside of the outside of the pipes.    -Taking an aspirin every day can help in the setting of a sudden heart attack.  Taking 81 mg every day is reasonable.    -Your rosuvastatin is a very powerful heart medicine.  It is not just a cholesterol medicine.  It reduces inflammation in the blood vessels and helps reduce growth of heart artery blockages.    Coronary artery calcification:    A CT scan is a still image of the body but the heart is always moving so everything seen around the heart is blurry.  \"Coronary artery calcification\" is a way of describing that you have calcified disease in the blood vessels to the heart. A regular CT scan cannot tell if the calcification is on the outside or inside, it simply tells whether or not it is present.  On a regular CT scan the radiologist typically categorizes it as mild, moderate or severe which is fairly subjective.  A Calcium Score CT scan gives an objective measurement of the amount of calcification.     Most people over 70 have some degree of calcified heart disease so I typically do not order calcium scores and I am not alarmed by calcified heart disease on a regular CT scan. I only order a calcium score for the purpose of determining if someone is likely to benefit from statin therapy.    Blockages in the heart artery should only have a stent or bypass if they are more " than 70% blocked (at which point most people have symptoms such as chest pain or unusual shortness of breath with activity that goes away with rest).   People having a sudden heart attack should have a stent placed in the blocked artery.   People having chest pain that limits their ability to function could consider having a stent placed in the artery.  Otherwise, medication and lifestyle changes are the preferred treatments. Stents placed outside of severe symptoms or sudden heart attacks do not reduce mortality (likelihood of dying from a heart attack) and often people end up needing a stent within a stent later.  Lifestyle and medications are more important than stents or bypass under most circumstances.    The only things to do for calcified heart disease to lower your risk of sudden heart attack (or stroke which is the same disease but a different location- in the brain):    -Take a statin medication (as a vascular medication, not just a cholesterol medication) which is our most powerful weapon to stabilize the plaques and reduce inflammation making them less likely to rupture open and cause a sudden heart attack/stroke.   -Keep LDL cholesterol under 100 (or under 70 if you have already had a heart attack/stroke).  -Control blood pressure, under 130/80, ideally closer to 120/80.  -Control blood sugar, don't get diabetes.  -Don't smoke.  -Eat a heart healthy diet that reduces inflammation: Pritikin, Mediterranean, Vegetarian, Vegan  -Get regular exercise: 150 minutes of moderate exercise OR 75 minutes of very vigorous exercise every week.  -Keep your body mass index under 30, ideal BMI is 20-25.  -Know the signs and symptoms of heart attack and stroke and when to call 9-1-1.  -Shoveling snow can provoke a heart attack because the cold weather constricts heart arteries and heavy/prolonged straining can reduce blood flow down heart arteries.    Signs of a stroke: sudden inability to talk, smile on one side,  "confusion, inability to move arm/leg on one side, numbness/tingling of arm/leg on one side that is not typical. \"Think FAST.\"  F=face drooping  A=arm weakness  S=Speech difficulty  T= time to call 911 (do not give ride to someone, call ambulance to alert the hospital to start stroke protocol)    Signs of a heart attack: Anything very intense coming from the chest that lasts more than 15 minutes, consider calling 911.  -Pain or pressure in the chest that is intense, lasts more than 15 minutes, not explained by other known reasons such as known/similar heartburn  -It can feel like severe heart burn but associated with nausea, vomiting  -It can be vague pain that radiates to the neck, jaw, shoulders, arm/arms, wrap around your back or even cause a toothache  -Can be associated with unusual shortness of breath at rest or sense of impending doom      Please look into the following diets:    Mediterranean diet.  Pritikin - used at [facility], probably one of the best for anti-inflammatory  DASH DIET - American Heart Association (mostly for hypertension)  Ornish Diet   Vegan diet (proven to reverse vascular disease)    Resources to learn more:  American Heart Association   Www.Cardiosmart.org, sponsored by the American College of cardiology.      -If you start having chest tightness that is keeping you from being physically active the way you would like, we would consider doing a left heart angiogram in [place].  My partner would sneak a catheter up to the heart arteries and inject contrast dye and see if there is a blockage that needs an intervention.  This is what your sister would have had done before bypass and when she had a stent put in her heart.    -I suspect that your shortness of breath with activity is more related to lungs at this point.    -If your heart palpitations begin to bother you more, we can change your carvedilol to a medication called metoprolol which is sometimes better for " controlling palpitations.  I would start with metoprolol tartrate 25 mg a.m. and p.m.  Let us know if you want to make this change.    -If your heart palpitations are progressing, we can also get a heart monitor on you called a Zio patch heart monitor that you can wear for 2 weeks.      Return in about 4 months (around 11/26/2021).    It is my pleasure to participate in the care of Ms. Chappell.  Please do not hesitate to contact me with questions or concerns.    Berna Arnett MD, [facility]  Cardiologist [facility]    Please note that this dictation was created using voice recognition software. I have made every reasonable attempt to correct obvious errors, but it is possible there are errors of grammar and possibly content that I did not discover before finalizing the note.        Lourdes Christensen, P.A.  [address] [place] [address]  Via Fax: [phone]

## 2021-07-26 NOTE — PROGRESS NOTES
Subjective:   Chief Complaint:   Chief Complaint   Patient presents with   • Hypertension     F/V DX:Essential hypertension, benign       Jose Chappell is a 76 y.o. female who returns today for HTN (labile at times), HLP, palpitations, SVT, PVCs,  and today HARGROVE and coronary artery calcification, statin intolerance, former smoker, rheumatoid arthritis, lightheadedness, family history CAD.    She started seeing cardiology for HTN.    HTN, BP labile at times.  BP at home 157-160 and 120, today lower.    HLP, LDL drawn recently, I am waiting for the results  Statin intolerance but able to take Crestor 5 mg once daily.  Also takes co-Q10.    Quit smoking in 2018.    Has palpitations, heart will race, can last up to 5 min, feels it is getting worse, makes her feel lightheaded.  No syncope.  Has been lightheaded at times standing up, has to wait for it to pass.  Not sure what her BP is at the time.  PVCs, on BB. Holter 2018 with 5% burden.  Brief 4 beat atrial runs noted.    Has HARGROVE, [facility] 2-3, related to lungs  Possible mild orthopnea but  no lower extremity swelling.   Seeing Dr. Efrain Albrecht now for allergies and PFTS.    Sees Dr. Conner Mullen, rheumatologist for Remicade injections for rheumatoid arthritis.    [place].  Every once in a while if walking too quickly, will feel some chest tightness or heaviness.  She will sit down to rest and it goes away  No symptoms of leg claudication.   Having nerve pains in legs when sitting and also cramps at night.  No stroke/TIA like symptoms.    No family history of premature coronary artery disease.  Parents  young.  Sister with open heart surgery and stent, started in her 60s.  Sister had mitral valve clip for mitral regurgitation.    No history of diabetes.  No history of autoimmune disease such as lupus or rheumatoid arthritis.  No chronic kidney disease.  No ETOH overuse.  No caffeine overuse.  No recreation substance use.    Lives with son and   in [place]  [place]    DATA REVIEWED by me:  ECG (my personal interpretation) 7-26-21  Sinus,     Holter monitor performed December 2018 showed normal sinus rhythm.  Frequent PVCs approximately 5% of total beats.  No sustained arrhythmias.  4 beat atrial runs and 4 beat ventricular runs.     Echo 12-3-2019   Normal left ventricular systolic function. Left ventricular ejection   fraction is visually estimated to be 60%.  Normal diastolic function.  Normal inferior vena cava size and inspiratory collapse.  Aortic sclerosis without stenosis.  Estimated right ventricular systolic pressure  is 29 mmHg.  Carotid duplex performed in April 2019 did not show any significant carotid stenosis.     CT scan of the chest without contrast Glenn Arechiga 5/28/2021  COPD and emphysema with scattered pleural-parenchymal scarring without suspicious nodules, heavy coronary artery atherosclerosis    Most recent labs:       Lab Results   Component Value Date/Time    WBC 8.5 07/07/2021 11:13 AM    HEMOGLOBIN 14.6 07/07/2021 11:13 AM    HEMATOCRIT 44.0 07/07/2021 11:13 AM    MCV 93.0 07/07/2021 11:13 AM    INR 0.99 10/08/2018 05:36 PM      Lab Results   Component Value Date/Time    SODIUM 139 07/07/2021 11:13 AM    POTASSIUM 4.5 07/07/2021 11:13 AM    CHLORIDE 103 07/07/2021 11:13 AM    CO2 27 07/07/2021 11:13 AM    GLUCOSE 99 07/07/2021 11:13 AM    BUN 16 07/07/2021 11:13 AM    CREATININE 0.8 07/07/2021 11:13 AM      Lab Results   Component Value Date/Time    ASTSGOT 19 07/07/2021 11:13 AM    ALTSGPT 23 07/07/2021 11:13 AM    ALBUMIN 3.6 07/07/2021 11:13 AM      Lab Results   Component Value Date/Time    CHOLSTRLTOT 191 01/25/2017 11:42 AM     (H) 01/25/2017 11:42 AM    HDL 48 01/25/2017 11:42 AM    TRIGLYCERIDE 103 01/25/2017 11:42 AM     No results for input(s): NTPROBNP, TROPONINT in the last 72 hours.      Past Medical History:   Diagnosis Date   • Hyperlipidemia    • Hypertension    • Rheumatoid arthritis, adult  ([facility])      Past Surgical History:   Procedure Laterality Date   • HYSTERECTOMY, VAGINAL     • TONSILLECTOMY       Family History   Problem Relation Age of Onset   • Other Mother         Breast cancer   • Lung Disease Father         COPD   • Heart Failure Sister    • Heart Attack Sister         MI at a59     Social History     Socioeconomic History   • Marital status:      Spouse name: Not on file   • Number of children: Not on file   • Years of education: Not on file   • Highest education level: Not on file   Occupational History   • Not on file   Tobacco Use   • Smoking status: Current Every Day Smoker     Packs/day: 1.00     Years: 40.00     Pack years: 40.00     Types: Cigarettes   • Smokeless tobacco: Never Used   Substance and Sexual Activity   • Alcohol use: No   • Drug use: No   • Sexual activity: Yes     Partners: Male     Comment:    Other Topics Concern   • Not on file   Social History Narrative   • Not on file     Social Determinants of Health     Financial Resource Strain:    • Difficulty of Paying Living Expenses:    Food Insecurity:    • Worried About Running Out of Food in the Last Year:    • Ran Out of Food in the Last Year:    Transportation Needs:    • Lack of Transportation (Medical):    • Lack of Transportation (Non-Medical):    Physical Activity:    • Days of Exercise per Week:    • Minutes of Exercise per Session:    Stress:    • Feeling of Stress :    Social Connections:    • Frequency of Communication with Friends and Family:    • Frequency of Social Gatherings with Friends and Family:    • Attends [employer] Services:    • Active Member of Clubs or Organizations:    • Attends Club or Organization Meetings:    • Marital Status:    Intimate Partner Violence:    • Fear of Current or Ex-Partner:    • Emotionally Abused:    • Physically Abused:    • Sexually Abused:      Allergies   Allergen Reactions   • Statins [Hmg-Coa-R Inhibitors] Myalgia   • Sulfacetamide Unspecified        Current Outpatient Medications   Medication Sig Dispense Refill   • Azelastine HCl 0.15 % Solution Administer 2 Sprays into affected nostril(S) 2 times a day. Each Nostril     • budesonide (PULMICORT) 0.5 MG/2ML Suspension ONE VIAL INHALATION THREE TIMES A DAY. DO NOT MIX WITH OTHER NEBULIZED MEDICATION.     • fluocinonide (LIDEX) 0.05 % Ointment Apply  topically.     • Fluticasone-Umeclidin-Vilant (TRELEGY ELLIPTA) 200-62.5-25 MCG/INH AEROSOL POWDER, BREATH ACTIVATED 1 puff(s)     • TRELEGY ELLIPTA 200-62.5-25 MCG/INH AEROSOL POWDER, BREATH ACTIVATED      • carvedilol (COREG) 6.25 MG Tab TAKE 1 TABLET BY MOUTH TWICE DAILY WITH MEALS 180 tablet 1   • amLODIPine (NORVASC) 5 MG Tab TAKE 1 TABLET BY MOUTH TWICE DAILY 180 tablet 1   • rosuvastatin (CRESTOR) 5 MG Tab Take 1 Tab by mouth every day. 90 Tab 3   • therapeutic multivitamin-minerals (THERAGRAN-M) Tab Take 1 Tab by mouth every day.     • Omega-3 Fatty Acids (FISH OIL) 1000 MG Cap capsule Take 1,000 mg by mouth every day.     • calcium carbonate (OS-LAURYN 500) 1250 (500 Ca) MG Tab Take 500 mg by mouth every day.     • coenzyme Q-10 30 MG capsule Take 60 mg by mouth every day.     • magnesium oxide (MAG-OX) 400 MG Tab Take 400 mg by mouth 1 time daily as needed.     • aspirin (ASA) 81 MG Chew Tab chewable tablet Take 81 mg by mouth every day.     • montelukast (SINGULAIR) 10 MG Tab Take 10 mg by mouth every bedtime.     • albuterol 108 (90 BASE) MCG/ACT Aero Soln inhalation aerosol Inhale 2 Puffs by mouth every 6 hours as needed for Shortness of Breath.     • escitalopram (LEXAPRO) 10 MG Tab Take 10 mg by mouth every day.     • infliximab (REMICADE) 100 MG Recon Soln 300 mg by Intravenous route Q 8 WEEKS.     • tiotropium (SPIRIVA) 18 MCG Cap Inhale 18 mcg by mouth every day.       No current facility-administered medications for this visit.       ROS  All others systems reviewed and negative.     Objective:     /64 (BP Location: Right arm, Patient  "Position: Sitting, BP Cuff Size: Adult)   Pulse (!) 54   Ht 1.651 m (5' 5\")   Wt 61.7 kg (136 lb)   SpO2 96%  Body mass index is 22.63 kg/m².    General: No acute distress. Well nourished.  HEENT: EOM grossly intact, no scleral icterus, no pharyngeal erythema.   Neck:  No JVD, no bruits, trachea midline  CVS: RRR.  Distant heart sounds, No camden M/R/G. No LE edema.  2+ radial pulses, 1+ PT pulses  Resp: Inspiratory and expiratory rhonchi, mildly prolonged expiratory phase normal respiratory effort.  Abdomen: Soft, NT, no camden hepatomegaly.  MSK/Ext: No clubbing or cyanosis.  Mild kyphosis  Skin: Warm and dry, no rashes.  Neurological: CN III-XII grossly intact. No focal deficits.   Psych: A&O x 3, appropriate affect, good judgement        Assessment:     1. PSVT (paroxysmal supraventricular tachycardia) (HCC)     2. Essential hypertension     3. PVC (premature ventricular contraction)     4. Essential hypertension, benign     5. Systolic murmur     6. Encounter for long-term (current) use of high-risk medication     7. Cigarette smoker     8. Palpitations     9. HARGROVE (dyspnea on exertion)     10. Statin intolerance     11. Former tobacco use     12. Seropositive rheumatoid arthritis of multiple sites (HCC)     13. Coronary artery calcification seen on CT scan     14. Family history of coronary artery disease in sister     15. Other chest pain  EKG   16. Pure hypercholesterolemia     17. Vitamin D deficiency  VITAMIN D,25 HYDROXY   18. Centrilobular emphysema (HCC)         Medical Decision Making:  Today's Assessment / Status / Plan:     -Terms of coronary artery disease, on primary prevention statin  -See below, monitor for symptoms  -Would be reasonable to add a primary prevention aspirin  -Blood pressure labile at times  -Lightheadedness persist at times, she will try to check her blood pressure  -Instructions regarding palpitations of medications below  -Dyspnea on exertion probably more lungs and heart rate " "now, see below  -Return to clinic in 4 months, further discuss if you want to get more aggressive with her care        Written instructions given today:      -Anyone who lives long enough and with certain risk factors were have hardening of the heart arteries.  It does not tell us if the arteries are blocked on the inside of the outside of the pipes.    -Taking an aspirin every day can help in the setting of a sudden heart attack.  Taking 81 mg every day is reasonable.    -Your rosuvastatin is a very powerful heart medicine.  It is not just a cholesterol medicine.  It reduces inflammation in the blood vessels and helps reduce growth of heart artery blockages.    Coronary artery calcification:    A CT scan is a still image of the body but the heart is always moving so everything seen around the heart is blurry.  \"Coronary artery calcification\" is a way of describing that you have calcified disease in the blood vessels to the heart. A regular CT scan cannot tell if the calcification is on the outside or inside, it simply tells whether or not it is present.  On a regular CT scan the radiologist typically categorizes it as mild, moderate or severe which is fairly subjective.  A Calcium Score CT scan gives an objective measurement of the amount of calcification.     Most people over 70 have some degree of calcified heart disease so I typically do not order calcium scores and I am not alarmed by calcified heart disease on a regular CT scan. I only order a calcium score for the purpose of determining if someone is likely to benefit from statin therapy.    Blockages in the heart artery should only have a stent or bypass if they are more than 70% blocked (at which point most people have symptoms such as chest pain or unusual shortness of breath with activity that goes away with rest).   People having a sudden heart attack should have a stent placed in the blocked artery.   People having chest pain that limits their ability " "to function could consider having a stent placed in the artery.  Otherwise, medication and lifestyle changes are the preferred treatments. Stents placed outside of severe symptoms or sudden heart attacks do not reduce mortality (likelihood of dying from a heart attack) and often people end up needing a stent within a stent later.  Lifestyle and medications are more important than stents or bypass under most circumstances.    The only things to do for calcified heart disease to lower your risk of sudden heart attack (or stroke which is the same disease but a different location- in the brain):    -Take a statin medication (as a vascular medication, not just a cholesterol medication) which is our most powerful weapon to stabilize the plaques and reduce inflammation making them less likely to rupture open and cause a sudden heart attack/stroke.   -Keep LDL cholesterol under 100 (or under 70 if you have already had a heart attack/stroke).  -Control blood pressure, under 130/80, ideally closer to 120/80.  -Control blood sugar, don't get diabetes.  -Don't smoke.  -Eat a heart healthy diet that reduces inflammation: Pritikin, Mediterranean, Vegetarian, Vegan  -Get regular exercise: 150 minutes of moderate exercise OR 75 minutes of very vigorous exercise every week.  -Keep your body mass index under 30, ideal BMI is 20-25.  -Know the signs and symptoms of heart attack and stroke and when to call 9-1-1.  -Shoveling snow can provoke a heart attack because the cold weather constricts heart arteries and heavy/prolonged straining can reduce blood flow down heart arteries.    Signs of a stroke: sudden inability to talk, smile on one side, confusion, inability to move arm/leg on one side, numbness/tingling of arm/leg on one side that is not typical. \"Think FAST.\"  F=face drooping  A=arm weakness  S=Speech difficulty  T= time to call 911 (do not give ride to someone, call ambulance to alert the hospital to start stroke " protocol)    Signs of a heart attack: Anything very intense coming from the chest that lasts more than 15 minutes, consider calling 911.  -Pain or pressure in the chest that is intense, lasts more than 15 minutes, not explained by other known reasons such as known/similar heartburn  -It can feel like severe heart burn but associated with nausea, vomiting  -It can be vague pain that radiates to the neck, jaw, shoulders, arm/arms, wrap around your back or even cause a toothache  -Can be associated with unusual shortness of breath at rest or sense of impending doom      Please look into the following diets:    Mediterranean diet.  Pritikin - used at [facility], probably one of the best for anti-inflammatory  DASH DIET - American Heart Association (mostly for hypertension)  Ornish Diet   Vegan diet (proven to reverse vascular disease)    Resources to learn more:  American Heart Association   Www.Cardiosmart.org, sponsored by the American College of cardiology.      -If you start having chest tightness that is keeping you from being physically active the way you would like, we would consider doing a left heart angiogram in [place].  My partner would sneak a catheter up to the heart arteries and inject contrast dye and see if there is a blockage that needs an intervention.  This is what your sister would have had done before bypass and when she had a stent put in her heart.    -I suspect that your shortness of breath with activity is more related to lungs at this point.    -If your heart palpitations begin to bother you more, we can change your carvedilol to a medication called metoprolol which is sometimes better for controlling palpitations.  I would start with metoprolol tartrate 25 mg a.m. and p.m.  Let us know if you want to make this change.    -If your heart palpitations are progressing, we can also get a heart monitor on you called a Zio patch heart monitor that you can wear for 2  weeks.      Return in about 4 months (around 11/26/2021).    It is my pleasure to participate in the care of Ms. Chappell.  Please do not hesitate to contact me with questions or concerns.    Berna Arnett MD, [facility]  Cardiologist [facility]    Please note that this dictation was created using voice recognition software. I have made every reasonable attempt to correct obvious errors, but it is possible there are errors of grammar and possibly content that I did not discover before finalizing the note.

## 2021-08-26 DIAGNOSIS — I10 HYPERTENSION, UNSPECIFIED TYPE: ICD-10-CM

## 2021-08-26 DIAGNOSIS — I47.10 PSVT (PAROXYSMAL SUPRAVENTRICULAR TACHYCARDIA) (HCC): ICD-10-CM

## 2021-08-26 DIAGNOSIS — I10 ESSENTIAL HYPERTENSION: ICD-10-CM

## 2021-08-26 RX ORDER — AMLODIPINE BESYLATE 5 MG/1
5 TABLET ORAL 2 TIMES DAILY
Qty: 180 TABLET | Refills: 3 | Status: SHIPPED | OUTPATIENT
Start: 2021-08-26 | End: 2022-04-20

## 2021-08-26 RX ORDER — CARVEDILOL 6.25 MG/1
6.25 TABLET ORAL 2 TIMES DAILY WITH MEALS
Qty: 180 TABLET | Refills: 3 | Status: SHIPPED | OUTPATIENT
Start: 2021-08-26 | End: 2022-03-31

## 2021-11-23 ENCOUNTER — TELEPHONE (OUTPATIENT)
Dept: CARDIOLOGY | Facility: MEDICAL CENTER | Age: 76
End: 2021-11-23

## 2021-11-23 NOTE — TELEPHONE ENCOUNTER
Spoke with pt and reminded to have Vitamin D level lab work complete before upcoming appt with LS on 12/14/21. Per pt request would like for lab order to be faxed to [facility] to have complete there. Fax sent. Fax confirmation received and faxed to [facility].    Appt time and date confirmed with pt.    Pending lab results.

## 2021-12-03 DIAGNOSIS — E55.9 VITAMIN D DEFICIENCY: ICD-10-CM

## 2021-12-08 ENCOUNTER — TELEPHONE (OUTPATIENT)
Dept: CARDIOLOGY | Facility: MEDICAL CENTER | Age: 76
End: 2021-12-08

## 2021-12-08 NOTE — TELEPHONE ENCOUNTER
----- Message from Berna Arnett M.D. sent at 12/8/2021 11:28 AM PST -----  Vitamin D low normal at 32.  --------------------------------------------------------------------------------------------------------    S/w pt's , informed him.

## 2021-12-14 ENCOUNTER — APPOINTMENT (OUTPATIENT)
Dept: CARDIOLOGY | Facility: CLINIC | Age: 76
End: 2021-12-14
Payer: MEDICARE

## 2022-01-19 ENCOUNTER — APPOINTMENT (OUTPATIENT)
Dept: CARDIOLOGY | Facility: PHYSICIAN GROUP | Age: 77
End: 2022-01-19
Payer: MEDICARE

## 2022-03-02 ENCOUNTER — NON-PROVIDER VISIT (OUTPATIENT)
Dept: CARDIOLOGY | Facility: PHYSICIAN GROUP | Age: 77
End: 2022-03-02
Payer: MEDICARE

## 2022-03-02 ENCOUNTER — OFFICE VISIT (OUTPATIENT)
Dept: CARDIOLOGY | Facility: PHYSICIAN GROUP | Age: 77
End: 2022-03-02
Payer: MEDICARE

## 2022-03-02 VITALS
WEIGHT: 131.17 LBS | HEART RATE: 60 BPM | OXYGEN SATURATION: 91 % | RESPIRATION RATE: 16 BRPM | BODY MASS INDEX: 21.85 KG/M2 | SYSTOLIC BLOOD PRESSURE: 104 MMHG | DIASTOLIC BLOOD PRESSURE: 72 MMHG | HEIGHT: 65 IN

## 2022-03-02 DIAGNOSIS — Z87.891 FORMER SMOKER: ICD-10-CM

## 2022-03-02 DIAGNOSIS — Z82.49 FAMILY HISTORY OF CORONARY ARTERY DISEASE IN SISTER: ICD-10-CM

## 2022-03-02 DIAGNOSIS — I10 ESSENTIAL HYPERTENSION: ICD-10-CM

## 2022-03-02 DIAGNOSIS — J43.2 CENTRILOBULAR EMPHYSEMA (HCC): ICD-10-CM

## 2022-03-02 DIAGNOSIS — I47.10 PSVT (PAROXYSMAL SUPRAVENTRICULAR TACHYCARDIA) (HCC): ICD-10-CM

## 2022-03-02 DIAGNOSIS — R01.1 SYSTOLIC MURMUR: ICD-10-CM

## 2022-03-02 DIAGNOSIS — E78.2 MIXED HYPERLIPIDEMIA: ICD-10-CM

## 2022-03-02 DIAGNOSIS — I49.3 PVC (PREMATURE VENTRICULAR CONTRACTION): ICD-10-CM

## 2022-03-02 DIAGNOSIS — I25.10 CORONARY ARTERY CALCIFICATION SEEN ON CT SCAN: ICD-10-CM

## 2022-03-02 DIAGNOSIS — I10 ESSENTIAL HYPERTENSION, BENIGN: ICD-10-CM

## 2022-03-02 DIAGNOSIS — E55.9 VITAMIN D DEFICIENCY: ICD-10-CM

## 2022-03-02 DIAGNOSIS — R00.2 PALPITATIONS: ICD-10-CM

## 2022-03-02 DIAGNOSIS — Z78.9 STATIN INTOLERANCE: ICD-10-CM

## 2022-03-02 DIAGNOSIS — R06.09 DOE (DYSPNEA ON EXERTION): ICD-10-CM

## 2022-03-02 PROCEDURE — 99214 OFFICE O/P EST MOD 30 MIN: CPT | Performed by: INTERNAL MEDICINE

## 2022-03-02 ASSESSMENT — FIBROSIS 4 INDEX: FIB4 SCORE: 1.042572070285373814

## 2022-03-02 NOTE — PATIENT INSTRUCTIONS
-Zio patch heart monitor.  The heart monitor will record everything for 2 weeks and then he send it back in the mail.  It also has a built in button so if there is something specifically you would like me to look at, pressed the button and write in the diary the symptoms you are feeling.    -See Yvonne our nurse practitioner after the heart monitor to see if we need to make any changes.

## 2022-03-02 NOTE — LETTER
[facility]   [address]  [place] [address]  Phone: [phone]  Fax: [phone]              Jose Chappell  1945    Encounter Date: 3/2/2022    Berna Arnett M.D.          PROGRESS NOTE:  Subjective:   Chief Complaint:   Chief Complaint   Patient presents with   • Paroxysmal Supraventricular Tachycardia (PSVT)   • Hypertension     F/V Dx: Essential hypertension, benign   • Hyperlipidemia     F/V Dx:   Mixed hyperlipidemia       Jose Chappell is a 76 y.o. female who returns today for HTN (labile at times), HLP, palpitations, SVT, PVCs, HARGROVE, coronary artery calcification, statin intolerance, former smoker, rheumatoid arthritis/biologics, lightheadedness, family history CAD.    Has palpitations, heart will race, can last up to 5 min, feels it is getting worse, makes her feel lightheaded.  No syncope.  Has been lightheaded at times standing up, has to wait for it to pass.  Not sure what her BP is at the time.  PVCs, on BB. Holter 2018 with 5% burden.    Brief 4 beat atrial runs noted.  Now palpitations are happening more frequently, 2-3 times a week, particularly notices when she lies down.    Quit smoking in 2018, had COPD.  Has HARGROVE, [facility] 2-3, related to lungs  Possible mild orthopnea but  no lower extremity swelling.   Seeing Dr. Efrain Albrecht now for allergies and PFTS.  Just got steroid shot and inhalers helped, feels worse when steroids wear off.    HTN, BP labile at times.  BP at home 157-160 and 120, today it is better.  More recently at home it has not been high.  130-140/60-80.    HLP, LDL drawn recently, I am waiting for the results  Statin intolerance but able to take Crestor 5 mg once daily.  Also takes co-Q10.    Sees Dr. Conner Mullen, rheumatologist for Remicade injections for rheumatoid arthritis.  Normal echo .    [place].  Every once in a while if walking too quickly,  will feel some chest tightness or heaviness.  She will sit down to rest and it goes away  No symptoms of leg claudication.   Having nerve pains in legs when sitting and also cramps at night.  No stroke/TIA like symptoms.    Parents  young.  Sister with open heart surgery and stent, started in her 60s.  Sister had mitral valve clip for mitral regurgitation.    No history of diabetes.  No history of autoimmune disease such as lupus or rheumatoid arthritis.  No chronic kidney disease.  No ETOH overuse.  No caffeine overuse.  No recreation substance use.    Lives with son and  in [place].  From [facility].    DATA REVIEWED by me:  ECG (my personal interpretation) 21  Sinus, 52, borderline RAD, PACs    Holter monitor performed 2018 showed normal sinus rhythm.  Frequent PVCs approximately 5% of total beats.  No sustained arrhythmias.  4 beat atrial runs and 4 beat ventricular runs.     Echo 12-3-2019   Normal left ventricular systolic function. Left ventricular ejection   fraction is visually estimated to be 60%.  Normal diastolic function.  Normal inferior vena cava size and inspiratory collapse.  Aortic sclerosis without stenosis.  Estimated right ventricular systolic pressure  is 29 mmHg.  Carotid duplex performed in 2019 did not show any significant carotid stenosis.     CT scan of the chest without contrast Glenn Arechiga 2021  COPD and emphysema with scattered pleural-parenchymal scarring without suspicious nodules, heavy coronary artery atherosclerosis    Most recent labs:       Lab Results   Component Value Date/Time    WBC 8.5 2021 11:13 AM    HEMOGLOBIN 14.6 2021 11:13 AM    HEMATOCRIT 44.0 2021 11:13 AM    MCV 93.0 2021 11:13 AM    INR 0.99 10/08/2018 05:36 PM      Lab Results   Component Value Date/Time    SODIUM 139 2021 11:13 AM    POTASSIUM 4.5 2021 11:13 AM    CHLORIDE 103 2021 11:13 AM    CO2 27 2021 11:13 AM    GLUCOSE 99  2021 11:13 AM    BUN 16 2021 11:13 AM    CREATININE 0.8 2021 11:13 AM      Lab Results   Component Value Date/Time    ASTSGOT 19 2021 11:13 AM    ALTSGPT 23 2021 11:13 AM    ALBUMIN 3.6 2021 11:13 AM      Lab Results   Component Value Date/Time    CHOLSTRLTOT 191 2017 11:42 AM     (H) 2017 11:42 AM    HDL 48 2017 11:42 AM    TRIGLYCERIDE 103 2017 11:42 AM     No results for input(s): NTPROBNP, TROPONINT in the last 72 hours.      Past Medical History:   Diagnosis Date   • Hyperlipidemia    • Hypertension    • Rheumatoid arthritis, adult (HCC)      Past Surgical History:   Procedure Laterality Date   • HYSTERECTOMY, VAGINAL     • TONSILLECTOMY       Family History   Problem Relation Age of Onset   • Other Mother         Breast cancer   • Lung Disease Father         COPD   • Heart Failure Sister    • Heart Attack Sister         MI at a59     Social History     Socioeconomic History   • Marital status:      Spouse name: Not on file   • Number of children: Not on file   • Years of education: Not on file   • Highest education level: Not on file   Occupational History   • Not on file   Tobacco Use   • Smoking status: Former Smoker     Packs/day: 1.00     Years: 40.00     Pack years: 40.00     Types: Cigarettes     Quit date:      Years since quittin.1   • Smokeless tobacco: Never Used   Substance and Sexual Activity   • Alcohol use: No   • Drug use: No   • Sexual activity: Yes     Partners: Male     Comment:    Other Topics Concern   • Not on file   Social History Narrative   • Not on file     Social Determinants of Health     Financial Resource Strain: Not on file   Food Insecurity: Not on file   Transportation Needs: Not on file   Physical Activity: Not on file   Stress: Not on file   Social Connections: Not on file   Intimate Partner Violence: Not on file   Housing Stability: Not on file     Allergies   Allergen Reactions   •  "Statins [Hmg-Coa-R Inhibitors] Myalgia   • Sulfacetamide Unspecified       Current Outpatient Medications   Medication Sig Dispense Refill   • amLODIPine (NORVASC) 5 MG Tab Take 1 Tablet by mouth 2 times a day. 180 Tablet 3   • carvedilol (COREG) 6.25 MG Tab Take 1 Tablet by mouth 2 times a day with meals. 180 Tablet 3   • Azelastine HCl 0.15 % Solution Administer 2 Sprays into affected nostril(S) 2 times a day. Each Nostril     • budesonide (PULMICORT) 0.5 MG/2ML Suspension ONE VIAL INHALATION THREE TIMES A DAY. DO NOT MIX WITH OTHER NEBULIZED MEDICATION.     • rosuvastatin (CRESTOR) 5 MG Tab Take 1 Tab by mouth every day. 90 Tab 3   • therapeutic multivitamin-minerals (THERAGRAN-M) Tab Take 1 Tab by mouth every day.     • Omega-3 Fatty Acids (FISH OIL) 1000 MG Cap capsule Take 1,000 mg by mouth every day.     • calcium carbonate (OS-LAURYN 500) 1250 (500 Ca) MG Tab Take 500 mg by mouth every day.     • coenzyme Q-10 30 MG capsule Take 60 mg by mouth every day.     • magnesium oxide (MAG-OX) 400 MG Tab Take 400 mg by mouth 1 time daily as needed.     • aspirin (ASA) 81 MG Chew Tab chewable tablet Take 81 mg by mouth every day.     • montelukast (SINGULAIR) 10 MG Tab Take 10 mg by mouth every bedtime.     • albuterol 108 (90 BASE) MCG/ACT Aero Soln inhalation aerosol Inhale 2 Puffs by mouth every 6 hours as needed for Shortness of Breath.     • escitalopram (LEXAPRO) 10 MG Tab Take 10 mg by mouth every day.     • infliximab (REMICADE) 100 MG Recon Soln 300 mg by Intravenous route Q 8 WEEKS.       No current facility-administered medications for this visit.       ROS    All others systems reviewed and negative.     Objective:     /72 (BP Location: Left arm, Patient Position: Sitting, BP Cuff Size: Adult)   Pulse 60   Resp 16   Ht 1.651 m (5' 5\")   Wt 59.5 kg (131 lb 2.8 oz)   SpO2 91%  Body mass index is 21.83 kg/m².    General: No acute distress. Well nourished.  HEENT: EOM grossly intact, no scleral " icterus, no pharyngeal erythema.   Neck:  No JVD, no bruits, trachea midline  CVS: RRR.  Distant heart sounds, No camden M/R/G. No LE edema.  2+ radial pulses, 1+ PT pulses  Resp: Inspiratory and expiratory rhonchi, mildly prolonged expiratory phase normal respiratory effort.  Abdomen: Soft, NT, no camden hepatomegaly.  MSK/Ext: No clubbing or cyanosis.  Mild kyphosis  Skin: Warm and dry, no rashes.  Neurological: CN III-XII grossly intact. No focal deficits.   Psych: A&O x 3, appropriate affect, good judgement    Physical exam performed today and unchanged, except what is noted, compared to 7-26-21      Assessment:     1. PSVT (paroxysmal supraventricular tachycardia) (HCC)  Cardiac Event Monitor   2. Palpitations  Cardiac Event Monitor   3. Essential hypertension     4. PVC (premature ventricular contraction)     5. Essential hypertension, benign     6. Systolic murmur     7. Vitamin D deficiency     8. HARGROVE (dyspnea on exertion)     9. Statin intolerance     10. Coronary artery calcification seen on CT scan     11. Family history of coronary artery disease in sister     12. Centrilobular emphysema (HCC)     13. Mixed hyperlipidemia     14. Former smoker         Medical Decision Making:  Today's Assessment / Status / Plan:     -In terms of palpitations, this has changed, will get a Zio patch heart  -Primary prevention for coronary artery disease, on primary prevention statin, ASA  -Reasonable to stay on primary prevention aspirin but low threshold to stop if any issues.  -Blood pressure controlled, prior labile blood pressure has appeared to resolve  -Dyspnea on exertion is mostly lungs, echo looked ok , low threshold to repeat.  Rare cases of cardiomyopathy with Remicade but nothing on PE or HPI that sounds cardiac.  -She will FU with Dr. Albrecht's office  -RTC 6 weeks      Written instructions given today:      -Zio patch heart monitor.  The heart monitor will record everything for 2 weeks and then he send it  back in the mail.  It also has a built in button so if there is something specifically you would like me to look at, pressed the button and write in the diary the symptoms you are feeling.    -See Yvonne our nurse practitioner after the heart monitor to see if we need to make any changes.      Return in about 4 weeks (around 3/30/2022).    It is my pleasure to participate in the care of Ms. Chappell.  Please do not hesitate to contact me with questions or concerns.    Berna Arnett MD, [facility]  Cardiologist [facility] and Vascular Health    Please note that this dictation was created using voice recognition software. I have made every reasonable attempt to correct obvious errors, but it is possible there are errors of grammar and possibly content that I did not discover before finalizing the note.          No Recipients

## 2022-03-02 NOTE — PROGRESS NOTES
Subjective:   Chief Complaint:   Chief Complaint   Patient presents with   • Paroxysmal Supraventricular Tachycardia (PSVT)   • Hypertension     F/V Dx: Essential hypertension, benign   • Hyperlipidemia     F/V Dx:   Mixed hyperlipidemia       Jose Chappell is a 76 y.o. female who returns today for HTN (labile at times), HLP, palpitations, SVT, PVCs, HARGROVE, coronary artery calcification, statin intolerance, former smoker, rheumatoid arthritis/biologics, lightheadedness, family history CAD.    Has palpitations, heart will race, can last up to 5 min, feels it is getting worse, makes her feel lightheaded.  No syncope.  Has been lightheaded at times standing up, has to wait for it to pass.  Not sure what her BP is at the time.  PVCs, on BB. Holter 2018 with 5% burden.    Brief 4 beat atrial runs noted.  Now palpitations are happening more frequently, 2-3 times a week, particularly notices when she lies down.    Quit smoking in 2018, had COPD.  Has HARGROVE, [facility] 2-3, related to lungs  Possible mild orthopnea but  no lower extremity swelling.   Seeing Dr. Efrain Albrecht now for allergies and PFTS.  Just got steroid shot and inhalers helped, feels worse when steroids wear off.    HTN, BP labile at times.  BP at home 157-160 and 120, today it is better.  More recently at home it has not been high.  130-140/60-80.    HLP, LDL drawn recently, I am waiting for the results  Statin intolerance but able to take Crestor 5 mg once daily.  Also takes co-Q10.    Sees Dr. Conner Mullen, rheumatologist for Remicade injections for rheumatoid arthritis.  Normal echo .    [place].  Every once in a while if walking too quickly, will feel some chest tightness or heaviness.  She will sit down to rest and it goes away  No symptoms of leg claudication.   Having nerve pains in legs when sitting and also cramps at night.  No stroke/TIA like symptoms.    Parents  young.  Sister with open heart surgery and stent,  started in her 60s.  Sister had mitral valve clip for mitral regurgitation.    No history of diabetes.  No history of autoimmune disease such as lupus or rheumatoid arthritis.  No chronic kidney disease.  No ETOH overuse.  No caffeine overuse.  No recreation substance use.    Lives with son and  in [place].  From [facility].    DATA REVIEWED by me:  ECG (my personal interpretation) 7-26-21  Sinus, 52, borderline RAD, PACs    Holter monitor performed December 2018 showed normal sinus rhythm.  Frequent PVCs approximately 5% of total beats.  No sustained arrhythmias.  4 beat atrial runs and 4 beat ventricular runs.     Echo 12-3-2019   Normal left ventricular systolic function. Left ventricular ejection   fraction is visually estimated to be 60%.  Normal diastolic function.  Normal inferior vena cava size and inspiratory collapse.  Aortic sclerosis without stenosis.  Estimated right ventricular systolic pressure  is 29 mmHg.  Carotid duplex performed in April 2019 did not show any significant carotid stenosis.     CT scan of the chest without contrast Glenn Arechiga 5/28/2021  COPD and emphysema with scattered pleural-parenchymal scarring without suspicious nodules, heavy coronary artery atherosclerosis    Most recent labs:       Lab Results   Component Value Date/Time    WBC 8.5 07/07/2021 11:13 AM    HEMOGLOBIN 14.6 07/07/2021 11:13 AM    HEMATOCRIT 44.0 07/07/2021 11:13 AM    MCV 93.0 07/07/2021 11:13 AM    INR 0.99 10/08/2018 05:36 PM      Lab Results   Component Value Date/Time    SODIUM 139 07/07/2021 11:13 AM    POTASSIUM 4.5 07/07/2021 11:13 AM    CHLORIDE 103 07/07/2021 11:13 AM    CO2 27 07/07/2021 11:13 AM    GLUCOSE 99 07/07/2021 11:13 AM    BUN 16 07/07/2021 11:13 AM    CREATININE 0.8 07/07/2021 11:13 AM      Lab Results   Component Value Date/Time    ASTSGOT 19 07/07/2021 11:13 AM    ALTSGPT 23 07/07/2021 11:13 AM    ALBUMIN 3.6 07/07/2021 11:13 AM      Lab Results   Component Value Date/Time     CHOLSTRLTOT 191 2017 11:42 AM     (H) 2017 11:42 AM    HDL 48 2017 11:42 AM    TRIGLYCERIDE 103 2017 11:42 AM     No results for input(s): NTPROBNP, TROPONINT in the last 72 hours.      Past Medical History:   Diagnosis Date   • Hyperlipidemia    • Hypertension    • Rheumatoid arthritis, adult (HCC)      Past Surgical History:   Procedure Laterality Date   • HYSTERECTOMY, VAGINAL     • TONSILLECTOMY       Family History   Problem Relation Age of Onset   • Other Mother         Breast cancer   • Lung Disease Father         COPD   • Heart Failure Sister    • Heart Attack Sister         MI at a59     Social History     Socioeconomic History   • Marital status:      Spouse name: Not on file   • Number of children: Not on file   • Years of education: Not on file   • Highest education level: Not on file   Occupational History   • Not on file   Tobacco Use   • Smoking status: Former Smoker     Packs/day: 1.00     Years: 40.00     Pack years: 40.00     Types: Cigarettes     Quit date:      Years since quittin.1   • Smokeless tobacco: Never Used   Substance and Sexual Activity   • Alcohol use: No   • Drug use: No   • Sexual activity: Yes     Partners: Male     Comment:    Other Topics Concern   • Not on file   Social History Narrative   • Not on file     Social Determinants of Health     Financial Resource Strain: Not on file   Food Insecurity: Not on file   Transportation Needs: Not on file   Physical Activity: Not on file   Stress: Not on file   Social Connections: Not on file   Intimate Partner Violence: Not on file   Housing Stability: Not on file     Allergies   Allergen Reactions   • Statins [Hmg-Coa-R Inhibitors] Myalgia   • Sulfacetamide Unspecified       Current Outpatient Medications   Medication Sig Dispense Refill   • amLODIPine (NORVASC) 5 MG Tab Take 1 Tablet by mouth 2 times a day. 180 Tablet 3   • carvedilol (COREG) 6.25 MG Tab Take 1 Tablet by mouth 2 times  "a day with meals. 180 Tablet 3   • Azelastine HCl 0.15 % Solution Administer 2 Sprays into affected nostril(S) 2 times a day. Each Nostril     • budesonide (PULMICORT) 0.5 MG/2ML Suspension ONE VIAL INHALATION THREE TIMES A DAY. DO NOT MIX WITH OTHER NEBULIZED MEDICATION.     • rosuvastatin (CRESTOR) 5 MG Tab Take 1 Tab by mouth every day. 90 Tab 3   • therapeutic multivitamin-minerals (THERAGRAN-M) Tab Take 1 Tab by mouth every day.     • Omega-3 Fatty Acids (FISH OIL) 1000 MG Cap capsule Take 1,000 mg by mouth every day.     • calcium carbonate (OS-LAURYN 500) 1250 (500 Ca) MG Tab Take 500 mg by mouth every day.     • coenzyme Q-10 30 MG capsule Take 60 mg by mouth every day.     • magnesium oxide (MAG-OX) 400 MG Tab Take 400 mg by mouth 1 time daily as needed.     • aspirin (ASA) 81 MG Chew Tab chewable tablet Take 81 mg by mouth every day.     • montelukast (SINGULAIR) 10 MG Tab Take 10 mg by mouth every bedtime.     • albuterol 108 (90 BASE) MCG/ACT Aero Soln inhalation aerosol Inhale 2 Puffs by mouth every 6 hours as needed for Shortness of Breath.     • escitalopram (LEXAPRO) 10 MG Tab Take 10 mg by mouth every day.     • infliximab (REMICADE) 100 MG Recon Soln 300 mg by Intravenous route Q 8 WEEKS.       No current facility-administered medications for this visit.       ROS    All others systems reviewed and negative.     Objective:     /72 (BP Location: Left arm, Patient Position: Sitting, BP Cuff Size: Adult)   Pulse 60   Resp 16   Ht 1.651 m (5' 5\")   Wt 59.5 kg (131 lb 2.8 oz)   SpO2 91%  Body mass index is 21.83 kg/m².    General: No acute distress. Well nourished.  HEENT: EOM grossly intact, no scleral icterus, no pharyngeal erythema.   Neck:  No JVD, no bruits, trachea midline  CVS: RRR.  Distant heart sounds, No camden M/R/G. No LE edema.  2+ radial pulses, 1+ PT pulses  Resp: Inspiratory and expiratory rhonchi, mildly prolonged expiratory phase normal respiratory effort.  Abdomen: Soft, NT, " no camden hepatomegaly.  MSK/Ext: No clubbing or cyanosis.  Mild kyphosis  Skin: Warm and dry, no rashes.  Neurological: CN III-XII grossly intact. No focal deficits.   Psych: A&O x 3, appropriate affect, good judgement    Physical exam performed today and unchanged, except what is noted, compared to 7-26-21      Assessment:     1. PSVT (paroxysmal supraventricular tachycardia) (HCC)  Cardiac Event Monitor   2. Palpitations  Cardiac Event Monitor   3. Essential hypertension     4. PVC (premature ventricular contraction)     5. Essential hypertension, benign     6. Systolic murmur     7. Vitamin D deficiency     8. HARGROVE (dyspnea on exertion)     9. Statin intolerance     10. Coronary artery calcification seen on CT scan     11. Family history of coronary artery disease in sister     12. Centrilobular emphysema (HCC)     13. Mixed hyperlipidemia     14. Former smoker         Medical Decision Making:  Today's Assessment / Status / Plan:     -In terms of palpitations, this has changed, will get a Zio patch heart  -Primary prevention for coronary artery disease, on primary prevention statin, ASA  -Reasonable to stay on primary prevention aspirin but low threshold to stop if any issues.  -Blood pressure controlled, prior labile blood pressure has appeared to resolve  -Dyspnea on exertion is mostly lungs, echo looked ok , low threshold to repeat.  Rare cases of cardiomyopathy with Remicade but nothing on PE or HPI that sounds cardiac.  -She will FU with Dr. Albrecht's office  -RTC 6 weeks      Written instructions given today:      -Zio patch heart monitor.  The heart monitor will record everything for 2 weeks and then he send it back in the mail.  It also has a built in button so if there is something specifically you would like me to look at, pressed the button and write in the diary the symptoms you are feeling.    -See Yvonne our nurse practitioner after the heart monitor to see if we need to make any  changes.      Return in about 4 weeks (around 3/30/2022).    It is my pleasure to participate in the care of Ms. Chappell.  Please do not hesitate to contact me with questions or concerns.    Berna Arnett MD, [facility]  Cardiologist [facility]    Please note that this dictation was created using voice recognition software. I have made every reasonable attempt to correct obvious errors, but it is possible there are errors of grammar and possibly content that I did not discover before finalizing the note.

## 2022-03-24 ENCOUNTER — TELEPHONE (OUTPATIENT)
Dept: CARDIOLOGY | Facility: MEDICAL CENTER | Age: 77
End: 2022-03-24
Payer: MEDICARE

## 2022-03-25 ENCOUNTER — TELEPHONE (OUTPATIENT)
Dept: CARDIOLOGY | Facility: MEDICAL CENTER | Age: 77
End: 2022-03-25
Payer: MEDICARE

## 2022-03-25 DIAGNOSIS — I10 ESSENTIAL HYPERTENSION: ICD-10-CM

## 2022-03-25 DIAGNOSIS — I49.3 PVC (PREMATURE VENTRICULAR CONTRACTION): ICD-10-CM

## 2022-03-25 DIAGNOSIS — R00.2 PALPITATIONS: ICD-10-CM

## 2022-03-25 DIAGNOSIS — I47.10 PSVT (PAROXYSMAL SUPRAVENTRICULAR TACHYCARDIA) (HCC): ICD-10-CM

## 2022-03-25 NOTE — TELEPHONE ENCOUNTER
Message  Received: Today  Berna Arnett M.D.  MANOHAR Pelaez, this patient has a high PVC burden.  I cannot be absolutely certain if the PVCs are causing her symptoms.  She had a lot of triggers with PVCs and bigeminy but there are plenty of other times where she did not have triggers and was experiencing the same things.  I would like to try changing her carvedilol over to metoprolol 25 mg twice daily to see if that helps settle down the palpitations.  If she feels no difference after the first 3 days, she can increase the metoprolol to 50 mg twice daily.  Please give her 90-day supply to start so she has plenty.  Thank you.                 Cardiac Event Monitor      LVM asking pt to call back to discuss.

## 2022-03-30 NOTE — TELEPHONE ENCOUNTER
Called pt and no answer. Advised pt to call back as recommendations from LS do require further explanation/teaching and not appropriate to leave as a VM. Will await call back from pt.

## 2022-03-30 NOTE — TELEPHONE ENCOUNTER
NASEEM    Pt is returning call from Danielle.  (she is off today)  Please reach out to her and if she does not , she asked for a message to be left.     Ph: [phone]    Thank You,  Lata PEREIRA

## 2022-03-31 DIAGNOSIS — E78.2 MIXED HYPERLIPIDEMIA: ICD-10-CM

## 2022-03-31 RX ORDER — ROSUVASTATIN CALCIUM 5 MG/1
5 TABLET, COATED ORAL DAILY
Qty: 90 TABLET | Refills: 3 | Status: SHIPPED | OUTPATIENT
Start: 2022-03-31 | End: 2023-06-12

## 2022-03-31 NOTE — TELEPHONE ENCOUNTER
Patient is calling back to talk to Smitha. Please reach out to her at [phone].    Thank you,   Julia MA

## 2022-03-31 NOTE — TELEPHONE ENCOUNTER
Called pt back and discussed specific recommendations per LS. Discussed monitor results and provided education. Advised on what symptoms to look out for including worsening palpitations, SOB, LE swelling. Advised metoprolol will be sent to [facility] and she should stop carvedilol once RX is received. Discussed specific instructions for pt to increase dose to 50mg BID should she feel no improvements to her symptoms after 3 days. Did also advise pt of using BP cuff and daily BP monitoring. Confirmed her FV with AB on 04/20 and advised her to call back if any questions/concerns prior to her FV. She verbalized understanding and was very appreciative.    MAR updated. Metoprolol 25mg BID 90-day supply sent to [facility]. Included in sig for pt to increase to 50mg BID is no improvement to symptoms after 3 days.

## 2022-04-19 ENCOUNTER — TELEPHONE (OUTPATIENT)
Dept: CARDIOLOGY | Facility: MEDICAL CENTER | Age: 77
End: 2022-04-19
Payer: MEDICARE

## 2022-04-20 ENCOUNTER — OFFICE VISIT (OUTPATIENT)
Dept: CARDIOLOGY | Facility: PHYSICIAN GROUP | Age: 77
End: 2022-04-20
Payer: MEDICARE

## 2022-04-20 VITALS
DIASTOLIC BLOOD PRESSURE: 64 MMHG | BODY MASS INDEX: 21.3 KG/M2 | WEIGHT: 127.87 LBS | OXYGEN SATURATION: 93 % | HEIGHT: 65 IN | SYSTOLIC BLOOD PRESSURE: 112 MMHG | HEART RATE: 83 BPM | RESPIRATION RATE: 16 BRPM

## 2022-04-20 DIAGNOSIS — I47.29 NSVT (NONSUSTAINED VENTRICULAR TACHYCARDIA) (HCC): ICD-10-CM

## 2022-04-20 DIAGNOSIS — I10 PRIMARY HYPERTENSION: ICD-10-CM

## 2022-04-20 DIAGNOSIS — R06.09 DOE (DYSPNEA ON EXERTION): ICD-10-CM

## 2022-04-20 DIAGNOSIS — J42 CHRONIC BRONCHITIS, UNSPECIFIED CHRONIC BRONCHITIS TYPE (HCC): ICD-10-CM

## 2022-04-20 DIAGNOSIS — E78.2 MIXED HYPERLIPIDEMIA: ICD-10-CM

## 2022-04-20 DIAGNOSIS — I49.3 PVC (PREMATURE VENTRICULAR CONTRACTION): ICD-10-CM

## 2022-04-20 DIAGNOSIS — I47.10 PAROXYSMAL SUPRAVENTRICULAR TACHYCARDIA (HCC): ICD-10-CM

## 2022-04-20 PROBLEM — S22.22XA FRACTURE OF BODY OF STERNUM, INITIAL ENCOUNTER FOR CLOSED FRACTURE: Status: RESOLVED | Noted: 2018-10-08 | Resolved: 2022-04-20

## 2022-04-20 PROBLEM — Z78.9 NO CONTRAINDICATION TO DEEP VEIN THROMBOSIS (DVT) PROPHYLAXIS: Status: RESOLVED | Noted: 2018-10-09 | Resolved: 2022-04-20

## 2022-04-20 PROCEDURE — 99214 OFFICE O/P EST MOD 30 MIN: CPT | Performed by: NURSE PRACTITIONER

## 2022-04-20 RX ORDER — DILTIAZEM HYDROCHLORIDE 120 MG/1
120 CAPSULE, COATED, EXTENDED RELEASE ORAL 2 TIMES DAILY
Qty: 60 CAPSULE | Refills: 3 | Status: SHIPPED | OUTPATIENT
Start: 2022-04-20 | End: 2022-06-08 | Stop reason: SDUPTHER

## 2022-04-20 ASSESSMENT — ENCOUNTER SYMPTOMS
PND: 0
ORTHOPNEA: 0
BRUISES/BLEEDS EASILY: 0
CHILLS: 0
COUGH: 0
HEADACHES: 0
ABDOMINAL PAIN: 0
NAUSEA: 0
INSOMNIA: 0
SHORTNESS OF BREATH: 1
PALPITATIONS: 1
DIZZINESS: 0
LOSS OF CONSCIOUSNESS: 0
FEVER: 0
MYALGIAS: 0

## 2022-04-20 ASSESSMENT — FIBROSIS 4 INDEX: FIB4 SCORE: 1.06

## 2022-04-20 NOTE — PROGRESS NOTES
Chief Complaint   Patient presents with   • Follow-Up   • Premature Ventricular Contractions (PVCs)   • Supraventricular Tachycardia (SVT)   • HTN (Controlled)   • Hyperlipidemia   • COPD       Subjective     Jose Chappell is a 77 y.o. female who presents today for one month follow-up of palpitations/PVCs.    Jose is a 77 year old female with history of SVT, PVCs, HTN, hyperlipidemia, coronary artery calcification and rheumatoid arthritis, normally followed by Dr. Arnett, and last seen in March 2022. At that time, she was having increased palpitations; ZioPatch was done, and showed PVC burden 5.6%, along with short runs of SVT/bigeminy. Coreg was changed to Metoprolol, but she could not tolerate this, as she developed shortness of breath. She stopped this a couple of days ago.    She is here today for one month follow-up. Her breathing is mostly stable; she does get allergy shots for her seasonal allergic rhinitis. Some occasional chest pressure with shortness of breath; she does feel some skipped beats. No orthopnea or PND; no dizziness or syncope; no LE edema. BP seems to be stable at home.    Past Medical History:   Diagnosis Date   • COPD (chronic obstructive pulmonary disease) ([facility])    • Hyperlipidemia    • Hypertension    • NSVT (nonsustained ventricular tachycardia) ([facility])    • PSVT (paroxysmal supraventricular tachycardia) ([facility])    • PVCs (premature ventricular contractions)    • Rheumatoid arthritis, adult ([facility])      Past Surgical History:   Procedure Laterality Date   • HYSTERECTOMY, VAGINAL     • TONSILLECTOMY       Family History   Problem Relation Age of Onset   • Other Mother         Breast cancer   • Lung Disease Father         COPD   • Heart Failure Sister    • Heart Attack Sister         MI at a59     Social History     Socioeconomic History   • Marital status:      Spouse name: Not on file   • Number of children: Not on file   • Years of education: Not on file   • Highest  education level: Not on file   Occupational History   • Not on file   Tobacco Use   • Smoking status: Former Smoker     Packs/day: 1.00     Years: 40.00     Pack years: 40.00     Types: Cigarettes     Quit date:      Years since quittin.3   • Smokeless tobacco: Never Used   Substance and Sexual Activity   • Alcohol use: No   • Drug use: No   • Sexual activity: Yes     Partners: Male     Comment:    Other Topics Concern   • Not on file   Social History Narrative   • Not on file     Social Determinants of Health     Financial Resource Strain: Not on file   Food Insecurity: Not on file   Transportation Needs: Not on file   Physical Activity: Not on file   Stress: Not on file   Social Connections: Not on file   Intimate Partner Violence: Not on file   Housing Stability: Not on file     Allergies   Allergen Reactions   • Statins [Hmg-Coa-R Inhibitors] Myalgia   • Sulfacetamide Unspecified     Outpatient Encounter Medications as of 2022   Medication Sig Dispense Refill   • DILTIAZem CD (CARDIZEM CD) 120 MG CAPSULE SR 24 HR Take 1 Capsule by mouth 2 times a day. 60 Capsule 3   • rosuvastatin (CRESTOR) 5 MG Tab Take 1 Tablet by mouth every day. 90 Tablet 3   • Azelastine HCl 0.15 % Solution Administer 2 Sprays into affected nostril(S) 2 times a day. Each Nostril     • therapeutic multivitamin-minerals (THERAGRAN-M) Tab Take 1 Tab by mouth every day.     • Omega-3 Fatty Acids (FISH OIL) 1000 MG Cap capsule Take 1,000 mg by mouth every day.     • calcium carbonate (OS-LAURYN 500) 1250 (500 Ca) MG Tab Take 500 mg by mouth every day.     • coenzyme Q-10 30 MG capsule Take 60 mg by mouth every day.     • magnesium oxide (MAG-OX) 400 MG Tab Take 400 mg by mouth 1 time daily as needed.     • aspirin (ASA) 81 MG Chew Tab chewable tablet Take 81 mg by mouth every day.     • montelukast (SINGULAIR) 10 MG Tab Take 10 mg by mouth every bedtime.     • albuterol 108 (90 BASE) MCG/ACT Aero Soln inhalation aerosol Inhale 2  "Puffs by mouth every 6 hours as needed for Shortness of Breath.     • escitalopram (LEXAPRO) 10 MG Tab Take 10 mg by mouth every day.     • infliximab (REMICADE) 100 MG Recon Soln 300 mg by Intravenous route Q 8 WEEKS.     • [DISCONTINUED] metoprolol tartrate (LOPRESSOR) 25 MG Tab Take 1 Tablet by mouth 2 times a day. If you don't notice any improvement in your symptoms 3 days after starting this medication, please increase your dose to 50mg twice daily (2 tablets twice daily). 180 Tablet 0   • [DISCONTINUED] amLODIPine (NORVASC) 5 MG Tab Take 1 Tablet by mouth 2 times a day. 180 Tablet 3   • [DISCONTINUED] budesonide (PULMICORT) 0.5 MG/2ML Suspension ONE VIAL INHALATION THREE TIMES A DAY. DO NOT MIX WITH OTHER NEBULIZED MEDICATION. (Patient not taking: Reported on 4/20/2022)       No facility-administered encounter medications on file as of 4/20/2022.     Review of Systems   Constitutional: Negative for chills and fever.   HENT: Negative for congestion.    Respiratory: Positive for shortness of breath. Negative for cough.    Cardiovascular: Positive for palpitations. Negative for chest pain, orthopnea, leg swelling and PND.   Gastrointestinal: Negative for abdominal pain and nausea.   Musculoskeletal: Positive for joint pain. Negative for myalgias.        History of rheumatoid arthritis.   Skin: Negative for rash.   Neurological: Negative for dizziness, loss of consciousness and headaches.   Endo/Heme/Allergies: Does not bruise/bleed easily.   Psychiatric/Behavioral: The patient does not have insomnia.               Objective     /64 (BP Location: Left arm, Patient Position: Sitting, BP Cuff Size: Adult)   Pulse 83   Resp 16   Ht 1.651 m (5' 5\")   Wt 58 kg (127 lb 13.9 oz)   SpO2 93%   BMI 21.28 kg/m²     Physical Exam  Constitutional:       Appearance: She is well-developed.   HENT:      Head: Normocephalic.   Neck:      Vascular: No JVD.   Cardiovascular:      Rate and Rhythm: Normal rate and regular " rhythm.      Heart sounds: Normal heart sounds.      Comments: Ectopy felt/heard.  Pulmonary:      Effort: Pulmonary effort is normal. No respiratory distress.      Breath sounds: Decreased air movement present. Decreased breath sounds present. No wheezing or rales.   Abdominal:      General: Bowel sounds are normal. There is no distension.      Palpations: Abdomen is soft.      Tenderness: There is no abdominal tenderness.   Musculoskeletal:         General: Normal range of motion.      Cervical back: Normal range of motion and neck supple.   Skin:     General: Skin is warm and dry.      Findings: No rash.   Neurological:      Mental Status: She is alert and oriented to person, place, and time.     ZioPatch Summary: 14 day monitor beginning 3/2/2022   1.  Sinus rhythm with appropriate heart rate range. Average 69, range 46 to 123 bpm.   2.  Normal sinus node and AV node conduction normal. No pauses.   3.  Rare PACs.   4.  Frequent PVCs, 5.6% burden.  Up to 15 seconds of bigeminy, 1 minute and 20 seconds of trigeminy.   5.  168 atrial runs, up to 13 seconds (longest consistent with atrial tachycardia). 6 runs of NS VT, up to 9 beats. No sustained rhythm changes.   6.  36 patient triggers during sinus, SVT, PVCs, bigeminy and trigeminy. Symptoms reported include pain/tingling, pounding, chest pressure, skipped/irregular beats, fluttering/racing.  Unclear if patient's symptoms were related to the PVCs since she has high frequency.  Clinical correlation recommended.       CONCLUSIONS OF ECHOCARDIOGRAM OF 12/3/2019:  Normal left ventricular systolic function. Left ventricular ejection   fraction is visually estimated to be 60%.  Normal diastolic function.  Normal inferior vena cava size and inspiratory collapse.  Aortic sclerosis without stenosis.  Estimated right ventricular systolic pressure  is 29 mmHg.    Lab Results   Component Value Date/Time    CHOLSTRLTOT 191 01/25/2017 11:42 AM     (H) 01/25/2017 11:42  AM    HDL 48 01/25/2017 11:42 AM    TRIGLYCERIDE 103 01/25/2017 11:42 AM       Lab Results   Component Value Date/Time    SODIUM 139 07/07/2021 11:13 AM    POTASSIUM 4.5 07/07/2021 11:13 AM    CHLORIDE 103 07/07/2021 11:13 AM    CO2 27 07/07/2021 11:13 AM    GLUCOSE 99 07/07/2021 11:13 AM    BUN 16 07/07/2021 11:13 AM    CREATININE 0.8 07/07/2021 11:13 AM     Lab Results   Component Value Date/Time    ALKPHOSPHAT 71 07/07/2021 11:13 AM    ASTSGOT 19 07/07/2021 11:13 AM    ALTSGPT 23 07/07/2021 11:13 AM    TBILIRUBIN 0.6 07/07/2021 11:13 AM        Assessment & Plan     1. PVC (premature ventricular contraction)  DILTIAZem CD (CARDIZEM CD) 120 MG CAPSULE SR 24 HR    EC-ECHOCARDIOGRAM COMPLETE W/O CONT   2. PSVT (paroxysmal supraventricular tachycardia) ([facility])     3. NSVT (nonsustained ventricular tachycardia) (HCC)     4. HARGROVE (dyspnea on exertion)  EC-ECHOCARDIOGRAM COMPLETE W/O CONT   5. Primary hypertension     6. Mixed hyperlipidemia     7. Chronic bronchitis, unspecified chronic bronchitis type (HCC)         Medical Decision Making: Today's Assessment/Status/Plan:        1. PVCs at 5.6% burden, with episodes of SVT/NSVT. Could not tolerate Metoprolol. Suggested stopping Amlodipine, and starting Diltiazem 120mg BID. Watch BP and HR at home, and follow-up with me in 1 month.    2. Dyspnea on exertion, with history of COPD, followed by pulmonology. To repeat echocardiogram.    3. Hypertension, treated with Amlodipine. As above, to replace with Diltiazem.  Watch BP at home.    4. Hyperlipidemia, treated with Crestor 5mg. Can only tolerate this dose.    5. Rheumatoid arthritis, followed by Dr. Mullen.    As above, replace Amlodipine with Diltiazem. Obtain echocardiogram. Follow-up with me in 1 month.

## 2022-04-21 ENCOUNTER — TELEPHONE (OUTPATIENT)
Dept: CARDIOLOGY | Facility: MEDICAL CENTER | Age: 77
End: 2022-04-21
Payer: MEDICARE

## 2022-04-21 NOTE — TELEPHONE ENCOUNTER
Medhat Garcia,  Pt called and reports her BP now being 140/84 and pulse is 96, after taking the   DILTIAZem CD (CARDIZEM CD) 120 MG CAPSULE SR 24 HR (Order #[id number]) on 4/20/22. She would like to know if she should take her evening pill.    Jose [phone]    Thank you,   Silvina GALVEZ

## 2022-04-21 NOTE — TELEPHONE ENCOUNTER
Called pt [phone]  Pt reports medication ready at pharmacy for  and no call to pharmacy is needed.   Pt reports vitor BP of 206/171 . Metoprolol d/c'd by AB at yesterday OV. Explained that this is probably the reason for elevated readings as pt has NOT started Diltiazem yet. Pt reports son will  medication asap.   NOW: /69 . Advised pt to take diltiazem medication, wait approx 1 hour, retake BP and HR readings. Pt is to call [phone] to relay new readings. Pt verbalized understanding.

## 2022-04-21 NOTE — TELEPHONE ENCOUNTER
AB    Pt states also her pulse was 107 AND HER BP: 206/171    Pt states the pharmacy is requesting an an approval from the [facility] Before they will fill her DILTIAZem CD (CARDIZEM CD) 120 MG CAPSULE SR 24 HR [[id number]]     Please reach out to the [facility] in [place].        Jose PH: [phone]    Thank You  Lata PEREIRA

## 2022-05-09 ENCOUNTER — TELEPHONE (OUTPATIENT)
Dept: CARDIOLOGY | Facility: MEDICAL CENTER | Age: 77
End: 2022-05-09
Payer: MEDICARE

## 2022-05-09 NOTE — TELEPHONE ENCOUNTER
Called pt [phone]  No answer, left VM to call [phone]  Regarding echo results and recommendations per AB

## 2022-05-09 NOTE — TELEPHONE ENCOUNTER
----- Message from MAURISIO Millan sent at 5/9/2022 10:51 AM PDT -----  Please let patient know that echo looks good:  Normal heart size , normal chamber sizes, normal heart function/squeeze.  Just MILD MR, AR and TR.  All good news. Same medications for now. Keep follow-up as scheduled.  Thanks, AB

## 2022-05-10 NOTE — TELEPHONE ENCOUNTER
Caller: Patient  Topic/issue: Pt returned call. Tried reaching nurse, but unavailable.  Callback Number: [phone]

## 2022-05-10 NOTE — TELEPHONE ENCOUNTER
Called pt [phone]  Relayed echo results per AB. Pt verbalized understanding and is appreciative of call.

## 2022-06-08 ENCOUNTER — OFFICE VISIT (OUTPATIENT)
Dept: CARDIOLOGY | Facility: PHYSICIAN GROUP | Age: 77
End: 2022-06-08
Payer: MEDICARE

## 2022-06-08 VITALS
RESPIRATION RATE: 16 BRPM | HEIGHT: 65 IN | HEART RATE: 70 BPM | WEIGHT: 126.32 LBS | OXYGEN SATURATION: 93 % | DIASTOLIC BLOOD PRESSURE: 76 MMHG | BODY MASS INDEX: 21.05 KG/M2 | SYSTOLIC BLOOD PRESSURE: 120 MMHG

## 2022-06-08 DIAGNOSIS — Z72.0 TOBACCO ABUSE: ICD-10-CM

## 2022-06-08 DIAGNOSIS — I70.0 ATHEROSCLEROSIS OF AORTA (HCC): ICD-10-CM

## 2022-06-08 DIAGNOSIS — I10 ESSENTIAL HYPERTENSION, BENIGN: ICD-10-CM

## 2022-06-08 DIAGNOSIS — E78.2 MIXED HYPERLIPIDEMIA: ICD-10-CM

## 2022-06-08 DIAGNOSIS — J42 CHRONIC BRONCHITIS, UNSPECIFIED CHRONIC BRONCHITIS TYPE (HCC): ICD-10-CM

## 2022-06-08 DIAGNOSIS — I47.10 PAROXYSMAL SUPRAVENTRICULAR TACHYCARDIA (HCC): ICD-10-CM

## 2022-06-08 DIAGNOSIS — I49.3 PVC (PREMATURE VENTRICULAR CONTRACTION): ICD-10-CM

## 2022-06-08 PROCEDURE — 99214 OFFICE O/P EST MOD 30 MIN: CPT | Performed by: NURSE PRACTITIONER

## 2022-06-08 RX ORDER — DILTIAZEM HYDROCHLORIDE 120 MG/1
120 CAPSULE, COATED, EXTENDED RELEASE ORAL 2 TIMES DAILY
Qty: 200 CAPSULE | Refills: 3 | Status: SHIPPED | OUTPATIENT
Start: 2022-06-08 | End: 2023-08-11 | Stop reason: SDUPTHER

## 2022-06-08 RX ORDER — ALENDRONATE SODIUM 70 MG/1
70 TABLET ORAL
COMMUNITY
Start: 2022-04-16

## 2022-06-08 RX ORDER — DOXYCYCLINE 100 MG/1
CAPSULE ORAL
COMMUNITY
Start: 2022-04-06 | End: 2022-06-08

## 2022-06-08 RX ORDER — DILTIAZEM HYDROCHLORIDE 120 MG/1
CAPSULE, EXTENDED RELEASE ORAL
COMMUNITY
End: 2022-06-08

## 2022-06-08 ASSESSMENT — ENCOUNTER SYMPTOMS
PND: 0
NAUSEA: 0
BRUISES/BLEEDS EASILY: 0
CHILLS: 0
INSOMNIA: 0
ORTHOPNEA: 0
LOSS OF CONSCIOUSNESS: 0
ABDOMINAL PAIN: 0
COUGH: 0
MYALGIAS: 0
PALPITATIONS: 1
FEVER: 0
SHORTNESS OF BREATH: 1
HEADACHES: 0
DIZZINESS: 0

## 2022-06-08 ASSESSMENT — FIBROSIS 4 INDEX: FIB4 SCORE: 1.06

## 2022-06-08 NOTE — PROGRESS NOTES
Chief Complaint   Patient presents with   • Follow-Up   • Paroxysmal Supraventricular Tachycardia (PSVT)   • Evaluation Of Medication Change       Subjective     Jose Chappell is a 77 y.o. female who presents today for six week follow-up of PSVT and PVCs, and medication change evaluation.    Jose is a 77 year old female with history of SVT, PVCs, HTN, hyperlipidemia, coronary artery calcification seen on CT scan and rheumatoid arthritis, last seen by me in April 202. ZioPatch showed PVC burden 5.6%, along with short runs of SVT/bigeminy. At last follow-up with me, we changed her from Amlodipine to Diltiazem, to help with symptomatic palpitations. Echocardiogram was also done.     She is here today for six week follow-up. She feels much better, much less palpitations, and BP is stable too. Her breathing is stable. No chest pain, pressure or discomfort. No orthopnea or PND; no dizziness or syncope; no LE edema. She does have some back pain with sciatica, and has been referred to PT.    Past Medical History:   Diagnosis Date   • COPD (chronic obstructive pulmonary disease) ([facility])    • Coronary artery calcification seen on CT scan    • Hyperlipidemia    • Hypertension 05/2022    Echocardiogram with normal LV size, LVEF 65%. Normal RA, LA and RV. Mild MR, mild AR, mild TR. RVSP 25mmHg.   • NSVT (nonsustained ventricular tachycardia) ([facility])    • PSVT (paroxysmal supraventricular tachycardia) ([facility])    • PVCs (premature ventricular contractions)    • Rheumatoid arthritis, adult ([facility])      Past Surgical History:   Procedure Laterality Date   • HYSTERECTOMY, VAGINAL     • TONSILLECTOMY       Family History   Problem Relation Age of Onset   • Other Mother         Breast cancer   • Lung Disease Father         COPD   • Heart Failure Sister    • Heart Attack Sister         MI at a59     Social History     Socioeconomic History   • Marital status:      Spouse name: Not on file   • Number of children: Not on file   •  Years of education: Not on file   • Highest education level: Not on file   Occupational History   • Not on file   Tobacco Use   • Smoking status: Former Smoker     Packs/day: 1.00     Years: 40.00     Pack years: 40.00     Types: Cigarettes     Quit date:      Years since quittin.4   • Smokeless tobacco: Never Used   Substance and Sexual Activity   • Alcohol use: Yes     Alcohol/week: 4.2 oz     Types: 7 Glasses of wine per week   • Drug use: No   • Sexual activity: Yes     Partners: Male     Comment:    Other Topics Concern   • Not on file   Social History Narrative   • Not on file     Social Determinants of Health     Financial Resource Strain: Not on file   Food Insecurity: Not on file   Transportation Needs: Not on file   Physical Activity: Not on file   Stress: Not on file   Social Connections: Not on file   Intimate Partner Violence: Not on file   Housing Stability: Not on file     Allergies   Allergen Reactions   • Statins [Hmg-Coa-R Inhibitors] Myalgia   • Sulfacetamide Unspecified     Outpatient Encounter Medications as of 2022   Medication Sig Dispense Refill   • alendronate (FOSAMAX) 70 MG Tab Take 70 mg by mouth every 7 days.     • DILTIAZem CD (CARDIZEM CD) 120 MG CAPSULE SR 24 HR Take 1 Capsule by mouth 2 times a day. 200 Capsule 3   • rosuvastatin (CRESTOR) 5 MG Tab Take 1 Tablet by mouth every day. 90 Tablet 3   • Azelastine HCl 0.15 % Solution Administer 2 Sprays into affected nostril(S) 2 times a day. Each Nostril     • therapeutic multivitamin-minerals (THERAGRAN-M) Tab Take 1 Tab by mouth every day.     • Omega-3 Fatty Acids (FISH OIL) 1000 MG Cap capsule Take 1,000 mg by mouth every day.     • calcium carbonate (OS-LAURYN 500) 1250 (500 Ca) MG Tab Take 500 mg by mouth every day.     • coenzyme Q-10 30 MG capsule Take 60 mg by mouth every day.     • magnesium oxide (MAG-OX) 400 MG Tab Take 400 mg by mouth 1 time daily as needed.     • aspirin (ASA) 81 MG Chew Tab chewable tablet  "Take 81 mg by mouth every day.     • montelukast (SINGULAIR) 10 MG Tab Take 10 mg by mouth every bedtime.     • albuterol 108 (90 BASE) MCG/ACT Aero Soln inhalation aerosol Inhale 2 Puffs by mouth every 6 hours as needed for Shortness of Breath.     • escitalopram (LEXAPRO) 10 MG Tab Take 10 mg by mouth every day.     • infliximab (REMICADE) 100 MG Recon Soln 300 mg by Intravenous route Q 8 WEEKS.     • [DISCONTINUED] doxycycline (MONODOX) 100 MG capsule TAKE 1 CAPSULE BY MOUTH TWICE DAILY FOR 10 DAYS (Patient not taking: Reported on 6/8/2022)     • [DISCONTINUED] diltiazem (CARDIZEM SR) 120 MG SR capsule 1 cap(s) (Patient not taking: Reported on 6/8/2022)     • [DISCONTINUED] DILTIAZem CD (CARDIZEM CD) 120 MG CAPSULE SR 24 HR Take 1 Capsule by mouth 2 times a day. 60 Capsule 3     No facility-administered encounter medications on file as of 6/8/2022.     Review of Systems   Constitutional: Negative for chills and fever.   HENT: Negative for congestion.    Respiratory: Positive for shortness of breath. Negative for cough.    Cardiovascular: Positive for palpitations. Negative for chest pain, orthopnea, leg swelling and PND.   Gastrointestinal: Negative for abdominal pain and nausea.   Musculoskeletal: Positive for joint pain. Negative for myalgias.        History of rheumatoid arthritis.   Skin: Negative for rash.   Neurological: Negative for dizziness, loss of consciousness and headaches.   Endo/Heme/Allergies: Does not bruise/bleed easily.   Psychiatric/Behavioral: The patient does not have insomnia.               Objective     /76 (BP Location: Left arm, Patient Position: Sitting, BP Cuff Size: Adult)   Pulse 70   Resp 16   Ht 1.651 m (5' 5\")   Wt 57.3 kg (126 lb 5.2 oz)   SpO2 93%   BMI 21.02 kg/m²     Physical Exam  Constitutional:       Appearance: She is well-developed.   HENT:      Head: Normocephalic.   Neck:      Vascular: No JVD.   Cardiovascular:      Rate and Rhythm: Normal rate and regular " rhythm.      Heart sounds: Normal heart sounds.      Comments: Ectopy felt/heard.  Pulmonary:      Effort: Pulmonary effort is normal. No respiratory distress.      Breath sounds: Decreased air movement present. Decreased breath sounds present. No wheezing or rales.   Abdominal:      General: Bowel sounds are normal. There is no distension.      Palpations: Abdomen is soft.      Tenderness: There is no abdominal tenderness.   Musculoskeletal:         General: Normal range of motion.      Cervical back: Normal range of motion and neck supple.   Skin:     General: Skin is warm and dry.      Findings: No rash.   Neurological:      Mental Status: She is alert and oriented to person, place, and time.     CONCLUSIONS OF ECHOCARDIOGRAM OF 5/5/2022:  The left ventricular ejection fraction is visually estimated to be 65%.  Aortic valve sclerosis without significant stenosis.  Estimated right ventricular systolic pressure is 25 mmHg.  Compared to the prior echo on 12/02/2019, no significant change.    CONCLUSIONS OF ECHOCARDIOGRAM OF 12/3/2019:  Normal left ventricular systolic function. Left ventricular ejection   fraction is visually estimated to be 60%.  Normal diastolic function.  Normal inferior vena cava size and inspiratory collapse.  Aortic sclerosis without stenosis. Estimated right ventricular systolic pressure  is 29 mmHg.    ZioPatch Summary: 14 day monitor beginning 3/2/2022   1.  Sinus rhythm with appropriate heart rate range. Average 69, range 46 to 123 bpm.   2.  Normal sinus node and AV node conduction normal. No pauses.   3.  Rare PACs.   4.  Frequent PVCs, 5.6% burden.  Up to 15 seconds of bigeminy, 1 minute and 20 seconds of trigeminy.   5.  168 atrial runs, up to 13 seconds (longest consistent with atrial tachycardia). 6 runs of NS VT, up to 9 beats. No sustained rhythm changes.   6.  36 patient triggers during sinus, SVT, PVCs, bigeminy and trigeminy. Symptoms reported include pain/tingling, pounding,  chest pressure, skipped/irregular beats, fluttering/racing.  Unclear if patient's symptoms were related to the PVCs since she has high frequency.  Clinical correlation recommended.          Assessment & Plan     1. PVC (premature ventricular contraction)  DILTIAZem CD (CARDIZEM CD) 120 MG CAPSULE SR 24 HR   2. PSVT (paroxysmal supraventricular tachycardia) ([facility])     3. Essential hypertension, benign     4. Mixed hyperlipidemia     5. Atherosclerosis of aorta (HCC)     6. Chronic bronchitis, unspecified chronic bronchitis type (HCC)     7. Tobacco abuse         Medical Decision Making: Today's Assessment/Status/Plan:      1. PVCs/PSVT, improved/stable on Diltiazem 120mg twice daily. She feels better on this compared to beta blocker and/or Amlodipine. Continue same dose. Reviewed echocardiogram, and she is reassured regarding normal heart function.    2. Hypertension, treated with Diltiazem, stable. BP is well controlled.    3. Hyperlipidemia, treated with Crestor 5mg.    4. Coronary artery calcifications/atherosclerosis of aorta, on ASA and statin.    5. COPD, related to tobacco use, followed by pulmonology.    She should continue with same medications. Keep follow-up with other providers. Follow-up with me in 6 months.

## 2022-09-14 ENCOUNTER — TELEPHONE (OUTPATIENT)
Dept: CARDIOLOGY | Facility: MEDICAL CENTER | Age: 77
End: 2022-09-14
Payer: MEDICARE

## 2022-09-14 NOTE — TELEPHONE ENCOUNTER
I'm not quite clear if she's concerned about high BP or low BP?  Her BP AFTER Diltiazem seems fine/at goal.  OK to be off of Lisinopril for now. I would Continue Diltiazem 120mg BID, and take BP AFTER meds (30-60 minutes), and we'll go from there.  Thanks, AB

## 2022-09-14 NOTE — TELEPHONE ENCOUNTER
Returned call to Lourdes LEE, pt's PCP. She states that she sees pt every week for allergy shots and her SBP has been consistently in the low 100 range, pt started to experience dizziness this week.     Lourdes did stop pt's lisinopril 2 weeks ago, pt is still taking Dilt 120 mg BID. She states that she just got off the phone w/ pt who states her BP in the morning before Dilt was 172/86 & 177/93, after she took Dilt  her SBP dropped to 115.     Lourdes would like to know if AB recommends any medication changes. She states that she will see pt on Monday again if AB wants to wait to see how BP is then. Lourdes states that if AB would like to further discuss directly with her, that would be ok.     To AB, please advise

## 2022-09-14 NOTE — TELEPHONE ENCOUNTER
AB        Caller: Lourdes from [facility]    Topic/issue: Their office was calling about the patient's DILTIAZem CD (CARDIZEM CD) 120 MG CAPSULE SR 24 HR medication. They stated her BP has been low and she's reporting dizziness and wanting to know if changes to the medications need to be made   Callback Number: [phone]      Thank you    -Cali ROY

## 2022-09-14 NOTE — TELEPHONE ENCOUNTER
Lourdes was concerned about low BP for pt. However, left detailed message for Lourdes informing her of AB's recommendations and to have pt continue monitoring BP/symptoms and let us know of any worsening or further concerns.

## 2022-12-07 ENCOUNTER — APPOINTMENT (OUTPATIENT)
Dept: CARDIOLOGY | Facility: PHYSICIAN GROUP | Age: 77
End: 2022-12-07
Payer: MEDICARE

## 2023-06-10 DIAGNOSIS — E78.2 MIXED HYPERLIPIDEMIA: ICD-10-CM

## 2023-06-12 RX ORDER — ROSUVASTATIN CALCIUM 5 MG/1
5 TABLET, COATED ORAL DAILY
Qty: 90 TABLET | Refills: 0 | Status: SHIPPED | OUTPATIENT
Start: 2023-06-12 | End: 2023-08-30 | Stop reason: SDUPTHER

## 2023-06-12 NOTE — TELEPHONE ENCOUNTER
Is the patient due for a refill? Yes    Was the patient seen the past year? Yes    Date of last office visit: 6/8/2023    Does the patient have an upcoming appointment?  Yes   If yes, When? 6/13/2023    Provider to refill:AB    Does the patients insurance require a 100 day supply?  No

## 2023-06-13 ENCOUNTER — APPOINTMENT (OUTPATIENT)
Dept: CARDIOLOGY | Facility: MEDICAL CENTER | Age: 78
End: 2023-06-13
Attending: NURSE PRACTITIONER
Payer: MEDICARE

## 2023-08-10 ENCOUNTER — TELEPHONE (OUTPATIENT)
Dept: CARDIOLOGY | Facility: MEDICAL CENTER | Age: 78
End: 2023-08-10
Payer: MEDICARE

## 2023-08-10 DIAGNOSIS — I49.3 PVC (PREMATURE VENTRICULAR CONTRACTION): ICD-10-CM

## 2023-08-10 NOTE — TELEPHONE ENCOUNTER
AB    Caller: Jose Chappell     Medication Name and Dosage: DILTIAZem CD (CARDIZEM CD) 120 MG CAPSULE SR 24 HR     Medication amount left: 4 days     Preferred Pharmacy: [id number] [address] [facility] [address] [facility] [address]    Other questions (Topic): Pharmacy states pt needs new order.    Callback Number (Will only call for issues): [phone]      Thank you   Kristie BERMAN

## 2023-08-11 RX ORDER — DILTIAZEM HYDROCHLORIDE 120 MG/1
120 CAPSULE, COATED, EXTENDED RELEASE ORAL 2 TIMES DAILY
Qty: 60 CAPSULE | Refills: 0 | Status: SHIPPED | OUTPATIENT
Start: 2023-08-11 | End: 2023-08-30 | Stop reason: SDUPTHER

## 2023-08-30 ENCOUNTER — OFFICE VISIT (OUTPATIENT)
Dept: CARDIOLOGY | Facility: PHYSICIAN GROUP | Age: 78
End: 2023-08-30
Payer: MEDICARE

## 2023-08-30 VITALS
BODY MASS INDEX: 18.84 KG/M2 | HEIGHT: 65 IN | SYSTOLIC BLOOD PRESSURE: 130 MMHG | RESPIRATION RATE: 12 BRPM | OXYGEN SATURATION: 94 % | HEART RATE: 65 BPM | DIASTOLIC BLOOD PRESSURE: 80 MMHG

## 2023-08-30 DIAGNOSIS — I47.10 PAROXYSMAL SUPRAVENTRICULAR TACHYCARDIA (HCC): ICD-10-CM

## 2023-08-30 DIAGNOSIS — E78.2 MIXED HYPERLIPIDEMIA: ICD-10-CM

## 2023-08-30 DIAGNOSIS — J42 CHRONIC BRONCHITIS, UNSPECIFIED CHRONIC BRONCHITIS TYPE (HCC): ICD-10-CM

## 2023-08-30 DIAGNOSIS — Z72.0 TOBACCO ABUSE: ICD-10-CM

## 2023-08-30 DIAGNOSIS — I10 ESSENTIAL HYPERTENSION, BENIGN: ICD-10-CM

## 2023-08-30 DIAGNOSIS — I70.0 ATHEROSCLEROSIS OF AORTA (HCC): ICD-10-CM

## 2023-08-30 DIAGNOSIS — I49.3 PVC (PREMATURE VENTRICULAR CONTRACTION): ICD-10-CM

## 2023-08-30 DIAGNOSIS — R63.4 WEIGHT LOSS: ICD-10-CM

## 2023-08-30 DIAGNOSIS — I47.29 NSVT (NONSUSTAINED VENTRICULAR TACHYCARDIA) (HCC): ICD-10-CM

## 2023-08-30 PROCEDURE — 3079F DIAST BP 80-89 MM HG: CPT | Performed by: NURSE PRACTITIONER

## 2023-08-30 PROCEDURE — 3075F SYST BP GE 130 - 139MM HG: CPT | Performed by: NURSE PRACTITIONER

## 2023-08-30 PROCEDURE — 99214 OFFICE O/P EST MOD 30 MIN: CPT | Performed by: NURSE PRACTITIONER

## 2023-08-30 RX ORDER — ROSUVASTATIN CALCIUM 5 MG/1
5 TABLET, COATED ORAL DAILY
Qty: 100 TABLET | Refills: 3 | Status: SHIPPED | OUTPATIENT
Start: 2023-08-30

## 2023-08-30 RX ORDER — DILTIAZEM HYDROCHLORIDE 120 MG/1
120 CAPSULE, COATED, EXTENDED RELEASE ORAL 2 TIMES DAILY
Qty: 200 CAPSULE | Refills: 3 | Status: SHIPPED | OUTPATIENT
Start: 2023-08-30

## 2023-08-30 ASSESSMENT — ENCOUNTER SYMPTOMS
INSOMNIA: 0
WEIGHT LOSS: 1
ABDOMINAL PAIN: 0
SHORTNESS OF BREATH: 0
LOSS OF CONSCIOUSNESS: 0
PALPITATIONS: 0
PND: 0
COUGH: 0
DIZZINESS: 0
FEVER: 0
BRUISES/BLEEDS EASILY: 0
NAUSEA: 0
MYALGIAS: 0
HEADACHES: 0
CHILLS: 0
ORTHOPNEA: 0
SENSORY CHANGE: 1

## 2023-08-30 NOTE — PROGRESS NOTES
Chief Complaint   Patient presents with    Follow-Up    Supraventricular Tachycardia (SVT)    HTN (Controlled)    Hyperlipidemia    COPD       Subjective     Jose Chappell is a 78 y.o. female who presents today for annual follow-up of SVT/PVCs, HTN, hyperlipidemia, and COPD.    Jose is a 78 year old female with history of SVT, PVCs, HTN, hyperlipidemia, coronary artery calcification seen on CT scan and rheumatoid arthritis, last seen by me in June 2022.     ZioPatch showed PVC burden 5.6%, along with short runs of SVT/bigeminy, well controlled with Diltiazem. Echocardiogram showed normal LV size and function.     She is here today for annual follow-up. She is doing well on Diltiazem and really doesn't feel palpitations anymore. She denies any chest pain, pressure, tightness or discomfort. No dyspnea, orthopnea or PND; she states she quit smoking last year. No dizziness, lightheadedness or syncope; no LE edema.     She has developed some LE neuropathy in the last year; she also notes she has lost some weight (10-15 pounds), without even trying.    Past Medical History:   Diagnosis Date    COPD (chronic obstructive pulmonary disease) (HCC)     Coronary artery calcification seen on CT scan     Hyperlipidemia     Hypertension 05/2022    Echocardiogram with normal LV size, LVEF 65%. Normal RA, LA and RV. Mild MR, mild AR, mild TR. RVSP 25mmHg.    NSVT (nonsustained ventricular tachycardia) (HCC)     PSVT (paroxysmal supraventricular tachycardia) (HCC)     PVCs (premature ventricular contractions)     Rheumatoid arthritis, adult (HCC)      Past Surgical History:   Procedure Laterality Date    HYSTERECTOMY, VAGINAL      TONSILLECTOMY       Family History   Problem Relation Age of Onset    Other Mother         Breast cancer    Lung Disease Father         COPD    Heart Failure Sister     Heart Attack Sister         MI at a59     Social History     Socioeconomic History    Marital status:      Spouse name:  Not on file    Number of children: Not on file    Years of education: Not on file    Highest education level: Not on file   Occupational History    Not on file   Tobacco Use    Smoking status: Former     Current packs/day: 0.00     Average packs/day: 1 pack/day for 40.0 years (40.0 ttl pk-yrs)     Types: Cigarettes     Start date: 1980     Quit date: 2020     Years since quitting: 3.6    Smokeless tobacco: Never   Substance and Sexual Activity    Alcohol use: Yes     Alcohol/week: 4.2 oz     Types: 7 Glasses of wine per week    Drug use: No    Sexual activity: Yes     Partners: Male     Comment:    Other Topics Concern    Not on file   Social History Narrative    Not on file     Social Determinants of Health     Financial Resource Strain: Not on file   Food Insecurity: Not on file   Transportation Needs: Not on file   Physical Activity: Not on file   Stress: Not on file   Social Connections: Not on file   Intimate Partner Violence: Not on file   Housing Stability: Not on file     Allergies   Allergen Reactions    Statins [Hmg-Coa-R Inhibitors] Myalgia    Sulfacetamide Unspecified     Outpatient Encounter Medications as of 8/30/2023   Medication Sig Dispense Refill    rosuvastatin (CRESTOR) 5 MG Tab Take 1 Tablet by mouth every day. 100 Tablet 3    DILTIAZem CD (CARDIZEM CD) 120 MG CAPSULE SR 24 HR Take 1 Capsule by mouth 2 times a day. 200 Capsule 3    alendronate (FOSAMAX) 70 MG Tab Take 70 mg by mouth every 7 days.      Azelastine HCl 0.15 % Solution Administer 2 Sprays into affected nostril(S) 2 times a day. Each Nostril      therapeutic multivitamin-minerals (THERAGRAN-M) Tab Take 1 Tab by mouth every day.      calcium carbonate (OS-LAURYN 500) 1250 (500 Ca) MG Tab Take 500 mg by mouth every day.      magnesium oxide (MAG-OX) 400 MG Tab Take 400 mg by mouth 1 time daily as needed.      aspirin (ASA) 81 MG Chew Tab chewable tablet Take 81 mg by mouth every day.      montelukast (SINGULAIR) 10 MG Tab Take 10  "mg by mouth every bedtime.      albuterol 108 (90 BASE) MCG/ACT Aero Soln inhalation aerosol Inhale 2 Puffs by mouth every 6 hours as needed for Shortness of Breath.      escitalopram (LEXAPRO) 10 MG Tab Take 10 mg by mouth every day.      infliximab (REMICADE) 100 MG Recon Soln 300 mg by Intravenous route Q 8 WEEKS.      [DISCONTINUED] DILTIAZem CD (CARDIZEM CD) 120 MG CAPSULE SR 24 HR Take 1 Capsule by mouth 2 times a day. 60 Capsule 0    [DISCONTINUED] rosuvastatin (CRESTOR) 5 MG Tab TAKE 1 TABLET BY MOUTH EVERY DAY 90 Tablet 0    [DISCONTINUED] Omega-3 Fatty Acids (FISH OIL) 1000 MG Cap capsule Take 1,000 mg by mouth every day. (Patient not taking: Reported on 8/30/2023)      [DISCONTINUED] coenzyme Q-10 30 MG capsule Take 60 mg by mouth every day. (Patient not taking: Reported on 8/30/2023)       No facility-administered encounter medications on file as of 8/30/2023.     Review of Systems   Constitutional:  Positive for weight loss. Negative for chills and fever.   HENT:  Negative for congestion.    Respiratory:  Negative for cough and shortness of breath.    Cardiovascular:  Negative for chest pain, palpitations, orthopnea, leg swelling and PND.   Gastrointestinal:  Negative for abdominal pain and nausea.   Musculoskeletal:  Positive for joint pain. Negative for myalgias.        History of rheumatoid arthritis.   Skin:  Negative for rash.   Neurological:  Positive for sensory change. Negative for dizziness, loss of consciousness and headaches.   Endo/Heme/Allergies:  Does not bruise/bleed easily.   Psychiatric/Behavioral:  The patient does not have insomnia.               Objective     BP (!) 140/92 (BP Location: Left arm, Patient Position: Sitting, BP Cuff Size: Adult)   Pulse 65   Resp 12   Ht 1.651 m (5' 5\")   SpO2 94%   BMI 18.84 kg/m²     Physical Exam  Constitutional:       Appearance: She is well-developed.   HENT:      Head: Normocephalic.   Neck:      Vascular: No JVD.   Cardiovascular:      Rate " and Rhythm: Normal rate and regular rhythm.      Heart sounds: Normal heart sounds.      Comments: Ectopy felt/heard.  Pulmonary:      Effort: Pulmonary effort is normal. No respiratory distress.      Breath sounds: Decreased air movement present. Decreased breath sounds present. No wheezing or rales.   Abdominal:      General: Bowel sounds are normal. There is no distension.      Palpations: Abdomen is soft.      Tenderness: There is no abdominal tenderness.   Musculoskeletal:         General: Normal range of motion.      Cervical back: Normal range of motion and neck supple.   Skin:     General: Skin is warm and dry.      Findings: No rash.   Neurological:      Mental Status: She is alert and oriented to person, place, and time.       CONCLUSIONS OF ECHOCARDIOGRAM OF 5/5/2022:  The left ventricular ejection fraction is visually estimated to be 65%.  Aortic valve sclerosis without significant stenosis.  Estimated right ventricular systolic pressure is 25 mmHg.  Compared to the prior echo on 12/02/2019, no significant change.     CONCLUSIONS OF ECHOCARDIOGRAM OF 12/3/2019:  Normal left ventricular systolic function. Left ventricular ejection   fraction is visually estimated to be 60%.  Normal diastolic function.  Normal inferior vena cava size and inspiratory collapse.  Aortic sclerosis without stenosis. Estimated right ventricular systolic pressure  is 29 mmHg.     ZioPatch Summary: 14 day monitor beginning 3/2/2022   1.  Sinus rhythm with appropriate heart rate range. Average 69, range 46 to 123 bpm.   2.  Normal sinus node and AV node conduction normal. No pauses.   3.  Rare PACs.   4.  Frequent PVCs, 5.6% burden.  Up to 15 seconds of bigeminy, 1 minute and 20 seconds of trigeminy.   5.  168 atrial runs, up to 13 seconds (longest consistent with atrial tachycardia). 6 runs of NS VT, up to 9 beats. No sustained rhythm changes.   6.  36 patient triggers during sinus, SVT, PVCs, bigeminy and trigeminy. Symptoms  reported include pain/tingling, pounding, chest pressure, skipped/irregular beats, fluttering/racing.  Unclear if patient's symptoms were related to the PVCs since she has high frequency.  Clinical correlation recommended.      No recent labwork done.    Assessment & Plan     1. PSVT (paroxysmal supraventricular tachycardia) (HCC)  MAGNESIUM      2. PVC (premature ventricular contraction)  DILTIAZem CD (CARDIZEM CD) 120 MG CAPSULE SR 24 HR    MAGNESIUM      3. NSVT (nonsustained ventricular tachycardia) (HCC)  MAGNESIUM      4. Essential hypertension, benign  CBC WITHOUT DIFFERENTIAL      5. Mixed hyperlipidemia  rosuvastatin (CRESTOR) 5 MG Tab    Comp Metabolic Panel    Lipid Profile      6. Atherosclerosis of aorta (HCC)        7. Chronic bronchitis, unspecified chronic bronchitis type (HCC)        8. Tobacco abuse        9. Weight loss  TSH          Medical Decision Making: Today's Assessment/Status/Plan:      1. History of SVT/PVCs, stable on Diltiazem. She is feeling much better.     2. Hypertension, treated with Diltiazem.    3. Hyperlipidemia/atherosclerosis of aorta seen on CT scan, treated with Crestor. To obtain fasting CMP and lipid panel.    4. COPD, related to previous smoking. She states she has quit smoking.    5. Weight loss, to check TSH.    As above, we will check various labs as she is overdue. Same medications, which are renewed today. We will make any changes based on these results. Follow-up annually, sooner if clinical condition changes.

## 2024-08-28 ENCOUNTER — APPOINTMENT (OUTPATIENT)
Dept: CARDIOLOGY | Facility: PHYSICIAN GROUP | Age: 79
End: 2024-08-28
Payer: MEDICARE

## 2024-09-03 DIAGNOSIS — I49.3 PVC (PREMATURE VENTRICULAR CONTRACTION): ICD-10-CM

## 2024-09-03 RX ORDER — DILTIAZEM HYDROCHLORIDE 120 MG/1
120 CAPSULE, COATED, EXTENDED RELEASE ORAL 2 TIMES DAILY
Qty: 180 CAPSULE | Refills: 0 | Status: SHIPPED | OUTPATIENT
Start: 2024-09-03

## 2024-09-03 NOTE — TELEPHONE ENCOUNTER
Is the patient due for a refill? Yes    Was the patient seen the past year? No    Date of last office visit: 8.30.2023    Does the patient have an upcoming appointment?  No       Provider to refill:AB    Does the patient have retirement Plus and need 100-day supply? (This applies to ALL medications) Patient does not have SCP

## 2024-10-09 ENCOUNTER — APPOINTMENT (OUTPATIENT)
Dept: CARDIOLOGY | Facility: PHYSICIAN GROUP | Age: 79
End: 2024-10-09
Payer: MEDICARE

## 2024-10-09 VITALS
HEIGHT: 65 IN | BODY MASS INDEX: 17.16 KG/M2 | SYSTOLIC BLOOD PRESSURE: 104 MMHG | HEART RATE: 65 BPM | RESPIRATION RATE: 15 BRPM | WEIGHT: 103 LBS | OXYGEN SATURATION: 92 % | DIASTOLIC BLOOD PRESSURE: 52 MMHG

## 2024-10-09 DIAGNOSIS — I49.3 PVC (PREMATURE VENTRICULAR CONTRACTION): ICD-10-CM

## 2024-10-09 DIAGNOSIS — I70.0 ATHEROSCLEROSIS OF AORTA (HCC): ICD-10-CM

## 2024-10-09 DIAGNOSIS — J42 CHRONIC BRONCHITIS, UNSPECIFIED CHRONIC BRONCHITIS TYPE (HCC): ICD-10-CM

## 2024-10-09 DIAGNOSIS — I10 ESSENTIAL HYPERTENSION, BENIGN: ICD-10-CM

## 2024-10-09 DIAGNOSIS — Z72.0 TOBACCO ABUSE: ICD-10-CM

## 2024-10-09 DIAGNOSIS — M05.79 SEROPOSITIVE RHEUMATOID ARTHRITIS OF MULTIPLE SITES (HCC): ICD-10-CM

## 2024-10-09 DIAGNOSIS — E78.2 MIXED HYPERLIPIDEMIA: ICD-10-CM

## 2024-10-09 DIAGNOSIS — I47.10 PAROXYSMAL SUPRAVENTRICULAR TACHYCARDIA (HCC): ICD-10-CM

## 2024-10-09 PROCEDURE — 3078F DIAST BP <80 MM HG: CPT | Performed by: NURSE PRACTITIONER

## 2024-10-09 PROCEDURE — 99214 OFFICE O/P EST MOD 30 MIN: CPT | Performed by: NURSE PRACTITIONER

## 2024-10-09 PROCEDURE — 3074F SYST BP LT 130 MM HG: CPT | Performed by: NURSE PRACTITIONER

## 2024-10-09 RX ORDER — ROSUVASTATIN CALCIUM 5 MG/1
5 TABLET, COATED ORAL DAILY
Qty: 100 TABLET | Refills: 3 | Status: SHIPPED | OUTPATIENT
Start: 2024-10-09

## 2024-10-09 RX ORDER — DILTIAZEM HYDROCHLORIDE 120 MG/1
120 CAPSULE, COATED, EXTENDED RELEASE ORAL 2 TIMES DAILY
Qty: 200 CAPSULE | Refills: 3 | Status: SHIPPED | OUTPATIENT
Start: 2024-10-09

## 2024-10-09 ASSESSMENT — ENCOUNTER SYMPTOMS
WEIGHT LOSS: 1
NAUSEA: 0
MYALGIAS: 0
HEADACHES: 0
PALPITATIONS: 0
ABDOMINAL PAIN: 0
PND: 0
DIZZINESS: 0
FEVER: 0
LOSS OF CONSCIOUSNESS: 0
INSOMNIA: 0
CHILLS: 0
ORTHOPNEA: 0
BRUISES/BLEEDS EASILY: 0
SHORTNESS OF BREATH: 0
COUGH: 0

## 2024-10-14 ENCOUNTER — TELEPHONE (OUTPATIENT)
Dept: CARDIOLOGY | Facility: MEDICAL CENTER | Age: 79
End: 2024-10-14
Payer: MEDICARE

## 2024-11-07 LAB
CHOLEST SERPL-MCNC: 197 MG/DL
HDLC SERPL-MCNC: 86 MG/DL
LDLC SERPL CALC-MCNC: 97 MG/DL
TRIGL SERPL-MCNC: 55 MG/DL

## 2024-11-08 DIAGNOSIS — J42 CHRONIC BRONCHITIS, UNSPECIFIED CHRONIC BRONCHITIS TYPE (HCC): ICD-10-CM

## 2024-12-27 ENCOUNTER — TELEPHONE (OUTPATIENT)
Dept: CARDIOLOGY | Facility: MEDICAL CENTER | Age: 79
End: 2024-12-27
Payer: MEDICARE

## 2024-12-27 DIAGNOSIS — I49.3 PVC (PREMATURE VENTRICULAR CONTRACTION): ICD-10-CM

## 2024-12-27 NOTE — TELEPHONE ENCOUNTER
AB    Caller: Jose Chappell    Topic/issue: Patient would like her medication transferred to her local pharmacy. She would like a few pills to last her until January 3rd since she'll be out over the weekend and can't get them until payday.     Medication: DILTIAZem CD (CARDIZEM CD) 120 MG CAPSULE SR 24 HR     Pharmacy: [id number] - [place], [place] - [address]. [[id number]]     Callback Number: [phone]    Thank you,  Sue WEBBER

## 2024-12-30 NOTE — TELEPHONE ENCOUNTER
Called to discuss medication request and clarify. No answer, voice message left.    Will await phone call back

## 2024-12-31 DIAGNOSIS — I49.3 PVC (PREMATURE VENTRICULAR CONTRACTION): ICD-10-CM

## 2024-12-31 RX ORDER — DILTIAZEM HYDROCHLORIDE 120 MG/1
120 CAPSULE, COATED, EXTENDED RELEASE ORAL 2 TIMES DAILY
Qty: 200 CAPSULE | Refills: 3 | Status: SHIPPED | OUTPATIENT
Start: 2024-12-31

## 2024-12-31 NOTE — TELEPHONE ENCOUNTER
Caller: Jose Chappell     Topic/issue: Patient is returning the call from yesterday and wants to check to make sure its being sent to [id number] - [place], [place] - [address]. [[id number]]   Patient is almost out of the medication. 2 days.  Please advise.    Callback Number: [phone]     Thank you,  Lola DELEON

## 2024-12-31 NOTE — TELEPHONE ENCOUNTER
Paige Barrientos P.A.-C.  You27 minutes ago (1:30 PM)       Yes, I sent the RX to [facility]

## 2024-12-31 NOTE — TELEPHONE ENCOUNTER
BARBARA(ADA): Patient has 2 pills left, AB OOO until 01/02/25. Please advise if okay to refill/ transfer Diltiazem to new pharmacy    =================    Phone Number Called: [phone]    Call outcome:  Voice message left     Message: Let patient know Diltiazem will be  sent to [facility]'s via voicemail. Advised patient to call back with any questions/concerns

## 2025-03-10 ENCOUNTER — OFFICE VISIT (OUTPATIENT)
Dept: CARDIOLOGY | Facility: MEDICAL CENTER | Age: 80
End: 2025-03-10
Attending: NURSE PRACTITIONER
Payer: MEDICARE

## 2025-03-10 VITALS
RESPIRATION RATE: 12 BRPM | SYSTOLIC BLOOD PRESSURE: 122 MMHG | HEART RATE: 68 BPM | WEIGHT: 111 LBS | DIASTOLIC BLOOD PRESSURE: 74 MMHG | HEIGHT: 65 IN | BODY MASS INDEX: 18.49 KG/M2 | OXYGEN SATURATION: 94 %

## 2025-03-10 DIAGNOSIS — I10 ESSENTIAL HYPERTENSION, BENIGN: ICD-10-CM

## 2025-03-10 DIAGNOSIS — I49.3 PVC (PREMATURE VENTRICULAR CONTRACTION): ICD-10-CM

## 2025-03-10 DIAGNOSIS — J42 CHRONIC BRONCHITIS, UNSPECIFIED CHRONIC BRONCHITIS TYPE (HCC): ICD-10-CM

## 2025-03-10 DIAGNOSIS — E78.2 MIXED HYPERLIPIDEMIA: ICD-10-CM

## 2025-03-10 DIAGNOSIS — Z72.0 TOBACCO ABUSE: ICD-10-CM

## 2025-03-10 PROCEDURE — 99214 OFFICE O/P EST MOD 30 MIN: CPT | Performed by: NURSE PRACTITIONER

## 2025-03-10 PROCEDURE — 3074F SYST BP LT 130 MM HG: CPT | Performed by: NURSE PRACTITIONER

## 2025-03-10 PROCEDURE — 3078F DIAST BP <80 MM HG: CPT | Performed by: NURSE PRACTITIONER

## 2025-03-10 PROCEDURE — 99213 OFFICE O/P EST LOW 20 MIN: CPT | Performed by: NURSE PRACTITIONER

## 2025-03-10 RX ORDER — DILTIAZEM HYDROCHLORIDE 120 MG/1
360 CAPSULE, COATED, EXTENDED RELEASE ORAL DAILY
Qty: 90 CAPSULE | Refills: 1 | Status: SHIPPED | OUTPATIENT
Start: 2025-03-10

## 2025-03-10 NOTE — PROGRESS NOTES
Medical decision making:  -Has complaints of headache, blurry vision, lightheadedness, dizziness during episodes of high blood pressure. She has been consistently taking her blood pressure at home that has been measuring in the 190s to 200s over the last 2 weeks. She has been given hydralazine 25 mg which is effective in lowering her blood pressure however medication makes her feel loopy.  -Diltiazem 120 mg twice daily has been effective at decreasing her blood pressure and controlling her palpitations until the last 2 weeks where both of been symptomatic for her.  -Denies any recent life stressors or painful infusions for RA.  -Plan to increase diltiazem to 120 mg in the morning and 240 mg at night.  -Instructed her to decrease her coffee consumption from 5 to 6 cups a day to maximum of 2 cups a day.  -Reiterated rated the importance of taking blood pressure properly. Instructions for this were explained again to her, she was understanding.  -Precautions given around strokelike symptoms and when to present to emergency room for concerning symptoms.  -Discussed importance of quitting smoking.   -Close follow-up in 4 weeks with Yvonne Valentin in [place].    Problem list:  1. Essential hypertension, benign    2. PVC (premature ventricular contraction)    3. Mixed hyperlipidemia    4. Tobacco abuse    5. Chronic bronchitis, unspecified chronic bronchitis type (HCC)     Chief Complaint:   Chief Complaint   Patient presents with    Hypertension     History of Present Illness:  Jose Chappell is a 79 y.o. female who returns for long-term management of elevated blood pressure. Has annual follow-up with Yvonne Valentin for long-term management of SVT, PVC's, HTN, hyperlipidemia/coronary artery calcification.     PMH of SVT, PVCs, HTN, hyperlipidemia/coronary artery calcification and RA.    She comes in today for concern of consistently high blood pressure readings at home. Checks BP at home, consistently measuring  190-200's systolic 80-90's diastolic. Endorses sitting quietly for at least 5 minutes prior to taking her blood pressure. However, also endorses drinking at least 5 to 6 cups of coffee per day. These high blood pressure readings happen in the morning and in the afternoon. She takes her blood pressure in the morning after taking her medications. 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     Symptoms during her high blood pressure episodes include headache, blurry vision, dizzy, lightheadedness. Also endorses palpitations which feels like her heart is racing. These are similar to prior palpitations for which she has been taking diltiazem for. No chest pain, LE edema. Palpitations are occurring out of the blue and resolve on their own.  She did have relief of palpitations and high blood pressure with diltiazem 120 mg twice daily however in the last 2 weeks hypertension and palpitations have returned. Also endorses polyuria in the last 2 weeks. Notes this may be related to increased coffee drinking.    Has been taking hydralazine 25 mg as needed for high blood pressure that does help with her BP but makes her feel 'loopy'. This brings her blood pressure down from 200-140.    Eye appointment recently, noted to have cholesterol spot' in her right eye.     Continues to smoke but is trying to cut down. Is using patches.    Not limited by chest pain, pressure or tightness with activity.  No significant dyspnea on exertion, orthopnea or lower extremity swelling.  No symptoms of leg claudication.  No stroke/TIA like symptoms.    Wt Readings from Last 5 Encounters:   03/10/25 50.3 kg (111 lb)   03/07/25 51.3 kg (113 lb)   01/07/25 49.4 kg (109 lb)   11/11/24 48.4 kg (106 lb 12.8 oz)   10/09/24 46.7 kg (103 lb)       DATA REVIEWED by me:  ECG 7/26/2021  SINUS BRADYCARDIA   BORDERLINE RIGHT AXIS DEVIATION     Echo 5/5/2022  The left ventricular ejection fraction is visually estimated  "to be 65%.  Aortic valve sclerosis without significant stenosis.  Estimated right ventricular systolic pressure is 25 mmHg.    Most recent labs:       Lab Results   Component Value Date/Time    WBC 8.5 07/07/2021 11:13 AM    HEMOGLOBIN 14.6 07/07/2021 11:13 AM    HEMATOCRIT 44.0 07/07/2021 11:13 AM    MCV 93.0 07/07/2021 11:13 AM    INR 0.99 10/08/2018 05:36 PM      Lab Results   Component Value Date/Time    SODIUM 139 07/07/2021 11:13 AM    POTASSIUM 4.5 07/07/2021 11:13 AM    CHLORIDE 103 07/07/2021 11:13 AM    CO2 27 07/07/2021 11:13 AM    GLUCOSE 99 07/07/2021 11:13 AM    BUN 16 07/07/2021 11:13 AM    CREATININE 0.8 07/07/2021 11:13 AM      Lab Results   Component Value Date/Time    ASTSGOT 19 07/07/2021 11:13 AM    ALTSGPT 23 07/07/2021 11:13 AM    ALBUMIN 3.6 07/07/2021 11:13 AM      Lab Results   Component Value Date/Time    CHOLSTRLTOT 197 11/07/2024 12:00 AM    LDL 97 11/07/2024 12:00 AM    HDL 86 11/07/2024 12:00 AM    TRIGLYCERIDE 55 11/07/2024 12:00 AM     No results for input(s): \"NTPROBNP\", \"TROPONINT\" in the last 72 hours.      Past Medical History:   Diagnosis Date    COPD (chronic obstructive pulmonary disease) ([facility])     Coronary artery calcification seen on CT scan     Hyperlipidemia     Hypertension 05/2022    Echocardiogram with normal LV size, LVEF 65%. Normal RA, LA and RV. Mild MR, mild AR, mild TR. RVSP 25mmHg.    NSVT (nonsustained ventricular tachycardia) ([facility])     PSVT (paroxysmal supraventricular tachycardia) ([facility])     PVCs (premature ventricular contractions)     Rheumatoid arthritis, adult ([facility])      Past Surgical History:   Procedure Laterality Date    HYSTERECTOMY, VAGINAL      TONSILLECTOMY       Family History   Problem Relation Age of Onset    Other Mother         Breast cancer    Lung Disease Father         COPD    Heart Failure Sister     Heart Attack Sister         MI at a59     Social History     Socioeconomic History    Marital status:      Spouse name: Not on file    " Number of children: Not on file    Years of education: Not on file    Highest education level: Not on file   Occupational History    Not on file   Tobacco Use    Smoking status: Former     Current packs/day: 0.00     Average packs/day: 1 pack/day for 40.0 years (40.0 ttl pk-yrs)     Types: Cigarettes     Start date:      Quit date:      Years since quittin.1    Smokeless tobacco: Never   Substance and Sexual Activity    Alcohol use: Yes     Alcohol/week: 4.2 oz     Types: 7 Glasses of wine per week    Drug use: No    Sexual activity: Yes     Partners: Male     Comment:    Other Topics Concern    Not on file   Social History Narrative    Not on file     Social Drivers of Health     Financial Resource Strain: Not on file   Food Insecurity: Not on file   Transportation Needs: Not on file   Physical Activity: Not on file   Stress: Not on file   Social Connections: Not on file   Intimate Partner Violence: Not on file   Housing Stability: Not on file     Allergies   Allergen Reactions    Statins [Hmg-Coa-R Inhibitors] Myalgia    Sulfacetamide Unspecified       Current Outpatient Medications   Medication Sig Dispense Refill    DILTIAZem CD (CARDIZEM CD) 120 MG CAPSULE SR 24 HR Take 1 Capsule by mouth 2 times a day. 200 Capsule 3    rosuvastatin (CRESTOR) 5 MG Tab Take 1 Tablet by mouth every day. 100 Tablet 3    alendronate (FOSAMAX) 70 MG Tab Take 70 mg by mouth every 7 days.      therapeutic multivitamin-minerals (THERAGRAN-M) Tab Take 1 Tab by mouth every day.      magnesium oxide (MAG-OX) 400 MG Tab Take 400 mg by mouth 1 time daily as needed.      montelukast (SINGULAIR) 10 MG Tab Take 10 mg by mouth at bedtime.      albuterol 108 (90 BASE) MCG/ACT Aero Soln inhalation aerosol Inhale 2 Puffs by mouth every 6 hours as needed for Shortness of Breath.      escitalopram (LEXAPRO) 10 MG Tab Take 10 mg by mouth every day.      infliximab (REMICADE) 100 MG Recon Soln 300 mg by Intravenous route Q 8 WEEKS.   "    Azelastine HCl 0.15 % Solution Administer 2 Sprays into affected nostril(S) 2 times a day. Each Nostril (Patient not taking: Reported on 3/10/2025)      calcium carbonate (OS-LAURYN 500) 1250 (500 Ca) MG Tab Take 500 mg by mouth every day. (Patient not taking: Reported on 3/10/2025)       No current facility-administered medications for this visit.     ROS  All others systems reviewed and negative.    /74 (BP Location: Left arm, Patient Position: Sitting, BP Cuff Size: Adult)   Pulse 68   Resp 12   Ht 1.651 m (5' 5\")   Wt 50.3 kg (111 lb)   SpO2 94%  Body mass index is 18.47 kg/m².    General: No acute distress. Well nourished.  HEENT: EOM grossly intact, no scleral icterus, no pharyngeal erythema.   Neck:  No JVD, trachea midline  CVS: RRR. Normal S1, S2. No M/R/G. No LE edema.  2+ radial pulses, 2+ PT pulses  Resp: CTAB. No wheezing or crackles/rhonchi. Normal respiratory effort.  Abdomen: Soft, NT, no camden hepatomegaly.  MSK/Ext: No clubbing or cyanosis.  Skin: Warm and dry, no rashes.  Neurological: CN III-XII grossly intact. No focal deficits.   Psych: A&O x 3, appropriate affect, good judgement    Return in about 4 weeks (around 4/7/2025) for Blood pressure follow up with AB in .    It is my pleasure to participate in the care of Ms. Chappell.  Please do not hesitate to contact me with questions or concerns.    Cass Renteria PA-C  Cardiologist, [facility]    Please note that this dictation was created using voice recognition software. I have made every reasonable attempt to correct obvious errors, but it is possible there are errors of grammar and possibly content that I did not discover before finalizing the note.  "

## 2025-06-19 DIAGNOSIS — I49.3 PVC (PREMATURE VENTRICULAR CONTRACTION): ICD-10-CM

## 2025-06-19 NOTE — TELEPHONE ENCOUNTER
Received request via: Patient    Was the patient seen in the last year in this department? Yes    Does the patient have an active prescription (recently filled or refills available) for medication(s) requested? Yes.     Pharmacy Name: [id number] - [place], [place] - [address]. [[id number]]     Does the patient have [facility] Plus and need 100-day supply? (This applies to ALL medications) Patient does not have SCP     DILTIAZem CD (CARDIZEM CD) 120 MG CAPSULE SR 24 HR   Patient is out of the medication    Thank you,  Lola DELEON

## 2025-06-20 RX ORDER — DILTIAZEM HYDROCHLORIDE 120 MG/1
360 CAPSULE, COATED, EXTENDED RELEASE ORAL DAILY
Qty: 270 CAPSULE | Refills: 0 | Status: SHIPPED | OUTPATIENT
Start: 2025-06-20

## 2025-06-20 NOTE — TELEPHONE ENCOUNTER
AB  Caller: Jose Chappell     Topic/issue: Patient following up on medication request , patient states she is out of medication .     Callback Number: [phone]     Thank you  Kristie BERMAN

## 2025-06-20 NOTE — TELEPHONE ENCOUNTER
Is the patient due for a refill? Yes    Was the patient seen the last 15 months? Yes    Date of last office visit: 03/10/2025    Does the patient have an upcoming appointment?  No       Provider to refill:AB    Does the patient have group home Plus and need 100-day supply? (This applies to ALL medications) Patient does not have SCP

## 2025-06-20 NOTE — TELEPHONE ENCOUNTER
Attempted to contact patient to discuss the blood pressures as per last office note patient was to follow up to discuss blood pressures.    Courtesy refill provided.    Scheduling, please contact patient to schedule follow up

## 2025-08-27 ENCOUNTER — APPOINTMENT (OUTPATIENT)
Dept: CARDIOLOGY | Facility: PHYSICIAN GROUP | Age: 80
End: 2025-08-27
Payer: MEDICARE